# Patient Record
Sex: FEMALE | Race: WHITE | Employment: OTHER | URBAN - METROPOLITAN AREA
[De-identification: names, ages, dates, MRNs, and addresses within clinical notes are randomized per-mention and may not be internally consistent; named-entity substitution may affect disease eponyms.]

---

## 2017-03-03 ENCOUNTER — OFFICE VISIT (OUTPATIENT)
Dept: RHEUMATOLOGY | Facility: CLINIC | Age: 82
End: 2017-03-03
Payer: COMMERCIAL

## 2017-03-03 ENCOUNTER — RADIANT APPOINTMENT (OUTPATIENT)
Dept: GENERAL RADIOLOGY | Facility: CLINIC | Age: 82
End: 2017-03-03
Attending: INTERNAL MEDICINE
Payer: COMMERCIAL

## 2017-03-03 VITALS
BODY MASS INDEX: 19.83 KG/M2 | DIASTOLIC BLOOD PRESSURE: 64 MMHG | TEMPERATURE: 97 F | SYSTOLIC BLOOD PRESSURE: 92 MMHG | HEIGHT: 60 IN | OXYGEN SATURATION: 95 % | HEART RATE: 83 BPM | WEIGHT: 101 LBS

## 2017-03-03 DIAGNOSIS — M25.561 CHRONIC PAIN OF BOTH KNEES: ICD-10-CM

## 2017-03-03 DIAGNOSIS — M25.551 BILATERAL HIP PAIN: ICD-10-CM

## 2017-03-03 DIAGNOSIS — G89.29 CHRONIC PAIN OF BOTH KNEES: ICD-10-CM

## 2017-03-03 DIAGNOSIS — M25.562 CHRONIC PAIN OF BOTH KNEES: ICD-10-CM

## 2017-03-03 DIAGNOSIS — M25.552 BILATERAL HIP PAIN: ICD-10-CM

## 2017-03-03 DIAGNOSIS — M06.9 RHEUMATOID ARTHRITIS INVOLVING MULTIPLE SITES, UNSPECIFIED RHEUMATOID FACTOR PRESENCE: Primary | ICD-10-CM

## 2017-03-03 PROCEDURE — 73502 X-RAY EXAM HIP UNI 2-3 VIEWS: CPT | Mod: LT

## 2017-03-03 PROCEDURE — 99213 OFFICE O/P EST LOW 20 MIN: CPT | Performed by: INTERNAL MEDICINE

## 2017-03-03 PROCEDURE — 73502 X-RAY EXAM HIP UNI 2-3 VIEWS: CPT

## 2017-03-03 PROCEDURE — 73502 X-RAY EXAM HIP UNI 2-3 VIEWS: CPT | Mod: RT

## 2017-03-03 PROCEDURE — 73562 X-RAY EXAM OF KNEE 3: CPT | Mod: LT

## 2017-03-03 RX ORDER — HYDROXYCHLOROQUINE SULFATE 200 MG/1
200 TABLET, FILM COATED ORAL DAILY
Qty: 90 TABLET | Refills: 1 | Status: SHIPPED | OUTPATIENT
Start: 2017-03-03

## 2017-03-03 NOTE — NURSING NOTE
Chief Complaint   Patient presents with     Arthritis     RA, patient complains of left knee pain       Initial BP 92/64 (BP Location: Left arm, Patient Position: Chair, Cuff Size: Adult Regular)  Pulse 83  Temp 97  F (36.1  C) (Oral)  Ht 1.524 m (5')  Wt 45.8 kg (101 lb)  SpO2 95%  BMI 19.73 kg/m2 Estimated body mass index is 19.73 kg/(m^2) as calculated from the following:    Height as of this encounter: 1.524 m (5').    Weight as of this encounter: 45.8 kg (101 lb).  BP completed using cuff size: regular         RAPID3 (0-30) Cumulative Score  14.7          RAPID3 Weighted Score (divide #4 by 3 and that is the weighted score)  4.9

## 2017-03-03 NOTE — PROGRESS NOTES
"Rheumatology Clinic Visit      Marisela Ortez MRN# [id number]   YOB: 1924 Age: 89 year old      Date of visit: 3/03/17   PCP: Dr. FRANTZ Robertson            [phone] (Work)            [phone] (Fax)     Chief Complaint   Patient presents with:  Arthritis: RA, patient complains of left knee pain      Assessment and Plan   1. Rheumatoid arthritis: Reportedly seropositive by record review, but I do not see lab values for rheumatoid factor or CCP, and no comments regarding them. Previously on Orencia (\" I just didn't like that one and it cost too much\"). Previously on methotrexate (d/c d by pt request, no adverse effect).  Currently on  mg daily and doing well from an RA perspective.  I still do not have reports of the HCQ toxicity exam from ophthalmology; I gave her a business card to have her ophthalmologist send me the report when she is seen next, which is supposed to be in May or June, per patient.   - Continue hydroxychloroquine 200 mg daily  - Labs today: CBC, creatinine, hepatic panel     2. Bilateral patellofemoral syndrome: Now also with hip and ankle pain. He previously did not want to go to physical therapy but is now willing to go.   - X-ray bilateral hips and knees   - Physical therapy referral     3. Dry mouth: After the clinic visit, the patient's daughter (who was present during the clinic visit) called to report that the patient was having some dry mouth and was wondering if HCQ could cause this. I explained that she may be experiencing secondary Sjogren Syndrome and that she should take frequent such of water, avoid caffeine and sugary foods, and we discuss further at follow-up, sooner if needed.    Ms. Ortez verbalized agreement with and understanding of the rational for the diagnosis and treatment plan.  All questions were answered to best of my ability and the patient's satisfaction. Ms. Ortez was advised to contact the clinic with any questions that may arise " after the clinic visit.      Thank you for involving me in the care of the patient    Return to clinic: 4 months     HPI   Marisela Ortez is a 89 year old female with a medical history significant for osteoarthritis, osteoporosis, chronic low back pain, and rheumatoid arthritis who presents for follow-up of rheumatoid arthritis.    Today, she reports that she is doing well from her rheumatoid arthritis perspective, but has persistent knee, and now hip and ankle pain too.   Knee, hip, and ankle pain are all worse with more activity and by the end of the day. She says that she feels better after going to the pool and sitting in a hot tub. Recently with a fall that resulted in a skin abrasion that has healed better after going to wound care. Denies morning stiffness. She reports that her previous right fourth metacarpal fracture healed nonsurgically and is not an issue today.    Denies fevers, chills, nausea, vomiting, constipation, diarrhea. No abdominal pain. No chest pain/pressure, palpitations, or shortness of breath. No oral or nasal sores. No neck pain. No rash. No LE swelling.     Tobacco: None    EtOH: None    Drugs: None     The patient's daughter was present with her during the clinic visit today.    ROS   GEN: No fevers, chills, or night sweats  SKIN: No rashes. See history of present illness  HEENT: No oral or nasal ulcers.  CV: No chest pain, pressure, palpitations, or dyspnea on exertion.  PULM: No SOB, wheeze, cough.  GI:  No nausea, vomiting, constipation, diarrhea. No blood in stool. No abdominal pain.  : No blood in urine.  MSK: See HPI.  NEURO: No numbness, tingling, or weakness.  EXT: See history of present illness    Active Problem List     Patient Active Problem List   Diagnosis     OA (osteoarthritis)     High risk medications (not anticoagulants) long-term use     RA (rheumatoid arthritis) (H)     Nonproductive cough     Osteoporosis     Low back pain     Pain in the neck     Past Medical  History     Past Medical History   Diagnosis Date     Inflammatory arthritis      OA (osteoarthritis) 5/18/2010     Past Surgical History     Past Surgical History   Procedure Laterality Date     C stomach surgery procedure unlisted       Allergy     Allergies   Allergen Reactions     Penicillins      Current Medication List     Current Outpatient Prescriptions   Medication Sig     order for DME Equipment being ordered: [facility] Fax [phone]    Primary Dressing Majo   Qty 15  Secondary Dressing 4' dermacea roll gauze Qty 30  Secondary Dressing 2' medipore tape Qty 1  Length of Need: 1 month  Frequency of dressing change: daily     hydroxychloroquine (PLAQUENIL) 200 MG tablet Take 1 tablet (200 mg) by mouth daily     ALPRAZolam (XANAX) 0.25 MG tablet Take 0.25 mg by mouth     Calcium Carbonate (CALCIUM OYSTER SHELL) 1250 (500 CA) MG TABS Take 1 Tab by mouth Once Daily.     simvastatin (ZOCOR) 10 MG tablet TAKE 1 TABLET BY MOUTH ONCE DAILY.     acetaminophen (TYLENOL) 325 MG tablet Take 325 mg by mouth as needed.     DILTIAZEM CD CP24# 180 MG OR 1 CAPSULE DAILY     NORTRIPTYLINE HCL 10 MG OR CAPS 1 CAPSULE AT BEDTIME     ONE-A-DAY 50 PLUS OR None Entered     VITAMIN D 1000 UNIT OR TABS 1 TABLET DAILY     sulfamethoxazole-trimethoprim (BACTRIM DS,SEPTRA DS) 800-160 MG per tablet Reported on 3/3/2017     No current facility-administered medications for this visit.          Social History   See HPI    Family History   History reviewed. No pertinent family history.      Physical Exam     Temp Readings from Last 3 Encounters:   03/03/17 97  F (36.1  C) (Oral)   11/06/12 97.7  F (36.5  C) (Oral)   09/13/10 97.4  F (36.3  C) (Oral)     BP Readings from Last 5 Encounters:   03/03/17 92/64   09/12/16 114/71   09/01/16 134/83   06/02/16 110/68   07/18/13 140/80     Pulse Readings from Last 1 Encounters:   03/03/17 83     Resp Readings from Last 1 Encounters:   06/02/16 16     Estimated body mass index is 19.73  kg/(m^2) as calculated from the following:    Height as of this encounter: 1.524 m (5').    Weight as of this encounter: 45.8 kg (101 lb).    GEN: NAD  HEENT: MMM. No oral lesions. Anicteric, noninjected sclera  CV: S1, S2. RRR. No lower L m/r/g.  PULM: CTA bilaterally. No w/c.  MSK: Synovial swelling of the bilateral second-third MCPs but without tenderness to palpation. Mild subluxation of the right second and third MCPs. Wrists, elbows, and shoulders without swelling or tenderness to palpation. Hips nontender to direct palpation or with range of motion. Knees with crepitation bilaterally but no swelling or increased warmth. Ankles without swelling or tenderness to palpation. Negative MTP squeeze. Pes planus bilaterally.   SKIN: Old postsurgical changes of her nose where it appears that a skin lesion was removed   EXT: No LE edema  PSYCH: Alert. Appropriate.    Labs / Imaging (select studies)     CBC  Recent Labs   Lab Test  09/01/16   0950  06/02/16   1447  07/15/13   1522   WBC  7.4  7.2  6.6   RBC  4.47  4.74  4.40   HGB  13.2  13.9  13.8   HCT  41.7  43.4  42.5   MCV  93  92  97   RDW  14.7  16.1*  14.1   PLT  215  266  237   MCH  29.5  29.3  31.4   MCHC  31.7  32.0  32.5   NEUTROPHIL  74.8  74.6  75.8   LYMPH  15.3  15.2  15.8   MONOCYTE  8.0  7.7  6.1   EOSINOPHIL  1.2  1.7  1.8   BASOPHIL  0.7  0.8  0.5   ANEU  5.5  5.4  5.1   ALYM  1.1  1.1  1.0   MARYANN  0.6  0.6  0.4   AEOS  0.1  0.1  0.1   ABAS  0.1  0.1  0.0     CMP  Recent Labs   Lab Test  09/01/16   0950 07/15/16  06/02/16   1447  07/15/13   1522  05/17/12  05/10/10   NA   --    --    --    --    --   139   --    --    POTASSIUM   --   3.8   --    --    --   3.8   --    --    CHLORIDE   --    --    --    --    --   103   --    --    ANIONGAP   --    --    --    --    --   8.0   --    --    GLC   --   83   --    --    --   86   --    --    BUN   --    --    --    --    --   16   --    --    CR  0.84  0.79  1.10*  0.79   < >  0.78   < >  0.69@    GFRESTIMATED  63  >60  46*  69   < >   --    < >  >60@   GFRESTBLACK  76  >60  56*  83   < >   --    < >  >60@   LINDA   --    --    --    --    --   9.5   --    --    BILITOTAL  0.5   --   0.3   --    --    --    --   0.5@   ALBUMIN  3.9   --   4.0  3.7   < >   --    < >  3.9@   PROTTOTAL  6.9   --   7.1   --    --    --    --   6.6@   ALKPHOS  97   --   102   --    --    --    --   89@   AST  24  25  31  34   < >   --    < >  22@   ALT  22  22  28  24   < >   --    < >  15@    < > = values in this interval not displayed.     Calcium/VitaminD  Recent Labs   Lab Test 05/17/12   LINDA  9.5     ESR/CRP  Recent Labs   Lab Test  06/02/16   1447   SED  7   CRP  <2.9     Lipid Panel  Recent Labs   Lab Test 07/15/16 05/31/13 05/17/12   CHOL  164  131  138   TRIG  83  90  70   HDL  71  56  65   LDL  76  57  59   CHOLHDLRATIO   --   2.3  2.1      Immunization History     There is no immunization history on file for this patient.       Chart documentation done in part with Dragon Voice recognition Software. Although reviewed after completion, some word and grammatical error may remain.    Len Vann MD    Many notes

## 2017-03-03 NOTE — MR AVS SNAPSHOT
After Visit Summary   3/3/2017    Marisela Ortez    MRN: [id number]           Patient Information     Date Of Birth          8/17/1924        Visit Information        Provider Department      3/3/2017 9:40 AM Len Vann MD [facility]        Today's Diagnoses     Rheumatoid arthritis involving multiple sites, unspecified rheumatoid factor presence (H)    -  1    Chronic pain of both knees        Bilateral hip pain           Follow-ups after your visit        Additional Services     CESAR PT, HAND, AND CHIROPRACTIC REFERRAL       **This order will print in the [facility] Scheduling Office**    Physical Therapy, Hand Therapy and Chiropractic Care are available through:    *[facility]  *[facility]  *[facility]    Call one number to schedule at any of the above locations: [phone].    Your provider has referred you to: Physical Therapy at [facility] or [facility]    Indication/Reason for Referral: bilateral knee pain, bilateral hip pain  Onset of Illness: years  Therapy Orders: Evaluate and Treat  Special Programs: None  Special Request: None    Luiza Jarrett      Additional Comments for the Therapist or Chiropractor: OA and RA    Please be aware that coverage of these services is subject to the terms and limitations of your health insurance plan.  Call member services at your health plan with any benefit or coverage questions.      Please bring the following to your appointment:    *Your personal calendar for scheduling future appointments  *Comfortable clothing                  Your next 10 appointments already scheduled     Jul 07, 2017  9:40 AM CDT   Return Visit with MD [facility] ([facility] [place])    [address]  [place] [address]   [phone]              Future tests that were ordered for you today     Open Future Orders        Priority Expected Expires Ordered    XR Knee Bilateral 3  "Views Routine 3/3/2017 3/3/2018 3/3/2017    XR Hip Right 2-3 Views Routine 3/3/2017 3/3/2018 3/3/2017    XR Hip Left 2-3 Views Routine 3/3/2017 3/3/2018 3/3/2017            Who to contact     If you have questions or need follow up information about today's clinic visit or your schedule please contact [facility] directly at [phone].  Normal or non-critical lab and imaging results will be communicated to you by MyChart, letter or phone within 4 business days after the clinic has received the results. If you do not hear from us within 7 days, please contact the clinic through [employer] or phone. If you have a critical or abnormal lab result, we will notify you by phone as soon as possible.  Submit refill requests through [employer] or call your pharmacy and they will forward the refill request to us. Please allow 3 business days for your refill to be completed.          Additional Information About Your Visit        [employer] Information     [employer] lets you send messages to your doctor, view your test results, renew your prescriptions, schedule appointments and more. To sign up, go to [email or web address] . Click on \"Log in\" on the left side of the screen, which will take you to the Welcome page. Then click on \"Sign up Now\" on the right side of the page.     You will be asked to enter the access code listed below, as well as some personal information. Please follow the directions to create your username and password.     Your access code is: [email or web address]  Expires: 2017 10:15 AM     Your access code will  in 90 days. If you need help or a new code, please call your [place] clinic or [phone].        Care EveryWhere ID     This is your Care EveryWhere ID. This could be used by other organizations to access your [place] medical records  [id number]        Your Vitals Were     Pulse Temperature Height Pulse Oximetry BMI (Body Mass Index)       83 97  F (36.1  C) (Oral) 1.524 m (5') 95% " 19.73 kg/m2        Blood Pressure from Last 3 Encounters:   03/03/17 92/64   09/12/16 114/71   09/01/16 134/83    Weight from Last 3 Encounters:   03/03/17 45.8 kg (101 lb)   06/02/16 46.7 kg (103 lb)   07/18/13 50 kg (110 lb 3.2 oz)              We Performed the Following     CESAR PT, HAND, AND CHIROPRACTIC REFERRAL          Where to get your medicines      These medications were sent to [id number] - [place], [place] - [address]  [address] [address]     Phone:  [phone]     hydroxychloroquine 200 MG tablet          Primary Care Provider Office Phone #    [facility] [phone]       No address on file        Thank you!     Thank you for choosing [facility]  for your care. Our goal is always to provide you with excellent care. Hearing back from our patients is one way we can continue to improve our services. Please take a few minutes to complete the written survey that you may receive in the mail after your visit with us. Thank you!             Your Updated Medication List - Protect others around you: Learn how to safely use, store and throw away your medicines at www.disposemymeds.org.          This list is accurate as of: 3/3/17 10:15 AM.  Always use your most recent med list.                   Brand Name Dispense Instructions for use    ALPRAZolam 0.25 MG tablet    XANAX     Take 0.25 mg by mouth       Calcium Oyster Shell 1250 (500 CA) MG Tabs      Take 1 Tab by mouth Once Daily.       cholecalciferol 1000 UNIT tablet    vitamin D     1 TABLET DAILY       DILTIAZEM CD CP24# 180 MG OR      1 CAPSULE DAILY       hydroxychloroquine 200 MG tablet    PLAQUENIL    90 tablet    Take 1 tablet (200 mg) by mouth daily       nortriptyline 10 MG capsule    PAMELOR     1 CAPSULE AT BEDTIME       ONE-A-DAY 50 PLUS PO      None Entered       order for DME     30 days    Equipment being ordered: [facility] Fax [phone]  Primary Dressing  Majo   Qty 15 Secondary Dressing 4' dermacea roll gauze Qty 30 Secondary Dressing 2' medipore tape Qty 1 Length of Need: 1 month Frequency of dressing change: daily       simvastatin 10 MG tablet    ZOCOR     TAKE 1 TABLET BY MOUTH ONCE DAILY.       sulfamethoxazole-trimethoprim 800-160 MG per tablet    BACTRIM DS/SEPTRA DS     Reported on 3/3/2017       TYLENOL 325 MG tablet   Generic drug:  acetaminophen      Take 325 mg by mouth as needed.

## 2017-03-03 NOTE — Clinical Note
Please fax my clinic note dated 3/3/2017 to Ms. Pérez's PCP (double check the contact info listed below):  Dr. FRANTZ Robertson           [phone] (Work)           [phone] (Fax)   Thank you, Len Vann MD 3/3/2017 12:29 PM

## 2017-03-13 ENCOUNTER — THERAPY VISIT (OUTPATIENT)
Dept: PHYSICAL THERAPY | Facility: CLINIC | Age: 82
End: 2017-03-13
Payer: COMMERCIAL

## 2017-03-13 DIAGNOSIS — M25.551 BILATERAL HIP PAIN: ICD-10-CM

## 2017-03-13 DIAGNOSIS — M25.562 CHRONIC PAIN OF BOTH KNEES: Primary | ICD-10-CM

## 2017-03-13 DIAGNOSIS — G89.29 CHRONIC PAIN OF BOTH KNEES: Primary | ICD-10-CM

## 2017-03-13 DIAGNOSIS — M25.561 CHRONIC PAIN OF BOTH KNEES: Primary | ICD-10-CM

## 2017-03-13 DIAGNOSIS — M25.552 BILATERAL HIP PAIN: ICD-10-CM

## 2017-03-13 PROCEDURE — G8978 MOBILITY CURRENT STATUS: HCPCS | Mod: GP | Performed by: PHYSICAL THERAPIST

## 2017-03-13 PROCEDURE — 97161 PT EVAL LOW COMPLEX 20 MIN: CPT | Mod: GP | Performed by: PHYSICAL THERAPIST

## 2017-03-13 PROCEDURE — G8979 MOBILITY GOAL STATUS: HCPCS | Mod: GP | Performed by: PHYSICAL THERAPIST

## 2017-03-13 PROCEDURE — 97110 THERAPEUTIC EXERCISES: CPT | Mod: GP | Performed by: PHYSICAL THERAPIST

## 2017-03-13 PROCEDURE — 97530 THERAPEUTIC ACTIVITIES: CPT | Mod: GP | Performed by: PHYSICAL THERAPIST

## 2017-03-13 ASSESSMENT — ACTIVITIES OF DAILY LIVING (ADL)
LIMPING: I DO NOT HAVE THE SYMPTOM
PAIN: I HAVE THE SYMPTOM BUT IT DOES NOT AFFECT MY ACTIVITY
STAND: ACTIVITY IS NOT DIFFICULT
STIFFNESS: I HAVE THE SYMPTOM BUT IT DOES NOT AFFECT MY ACTIVITY
GIVING WAY, BUCKLING OR SHIFTING OF KNEE: I DO NOT HAVE THE SYMPTOM
AS_A_RESULT_OF_YOUR_KNEE_INJURY,_HOW_WOULD_YOU_RATE_YOUR_CURRENT_LEVEL_OF_DAILY_ACTIVITY?: NEARLY NORMAL
KNEEL ON THE FRONT OF YOUR KNEE: NOT ANSWERED
HOW_WOULD_YOU_RATE_THE_CURRENT_FUNCTION_OF_YOUR_KNEE_DURING_YOUR_USUAL_DAILY_ACTIVITIES_ON_A_SCALE_FROM_0_TO_100_WITH_100_BEING_YOUR_LEVEL_OF_KNEE_FUNCTION_PRIOR_TO_YOUR_INJURY_AND_0_BEING_THE_INABILITY_TO_PERFORM_ANY_OF_YOUR_USUAL_DAILY_ACTIVITIES?: 50
SWELLING: I HAVE THE SYMPTOM BUT IT DOES NOT AFFECT MY ACTIVITY
WALK: NOT ANSWERED
GO UP STAIRS: ACTIVITY IS SOMEWHAT DIFFICULT
SIT WITH YOUR KNEE BENT: ACTIVITY IS NOT DIFFICULT
RISE FROM A CHAIR: ACTIVITY IS SOMEWHAT DIFFICULT
GO DOWN STAIRS: ACTIVITY IS SOMEWHAT DIFFICULT
WEAKNESS: THE SYMPTOM AFFECTS MY ACTIVITY SLIGHTLY
KNEE_ACTIVITY_OF_DAILY_LIVING_SUM: 47
SQUAT: ACTIVITY IS SOMEWHAT DIFFICULT
HOW_WOULD_YOU_RATE_THE_OVERALL_FUNCTION_OF_YOUR_KNEE_DURING_YOUR_USUAL_DAILY_ACTIVITIES?: NEARLY NORMAL

## 2017-03-13 NOTE — PROGRESS NOTES
Physical Therapy Initial Examination/Evaluation  Precautions/Restrictions/MD instructions  B hip and knee pain, ET    Therapist Impression:   Marisela is a 89 yr old woman with complains of chronic B hip and knee pain.  X rays show loss of patellofemoral joint space B and unremarkable hip xrays normal for her age.  She has stairs to the basement at home she sometimes uses for exercise and participates in a 1 hr arthritis water aerobics class 3x/wk.  Hip ROM WFL, knee ROM full extension and flexion WFL.  Mild loss of flexibility in B hip flexors.  Global hip and knee strength 4-/5 with glute med 3+/5 B.     Subjective:  DOI/onset: Chronic 15+ yr hx, changed arthritis doctors and is trying PT on new MD's suggestion.  MD order date  DOS:   Acute Injury or Gradual Onset?: gradual  Mechanism of Injury:   Related PMH: degenerative joint disease, RA  Previous Treatment: none  Effect of prior treatment:   Imaging: x rays show OA B patellofemoral joints, normal/age appropriate hip xray  Chief Complaint/Functional Limitations: stairs, rising from sitting   Pain:6/10 Location:anterior knees B Frequency: intermittant but daily Described as:  ache Alleviated by: ?  Progression of Symptoms:   Staying same  Time of day when pain is worse:  AM, PM  Sleeping:  No waking  Occupation:   Job duties:   Current HEP/exercise regimen: arthritis exercise in water 3x/wk for 1 hr. Stairs at home several times a week to basement.  Patient's goals are rise from sitting, stairs with less pain    Other pertinent PMH/Red Flags: RA  Barriers at home/work:  Lives alone  Pertinent Surgical History: none  Medications: RA meds  General health as reported by patient: good  Return to MD: prn      HPI                    Objective:    Standing Alignment:        Lumbar:  Lordosis decr                Flexibility/Screens:       Lower Extremity:  Decreased left lower extremity flexibility:Hip Flexors    Decreased right lower extremity flexibility:  Hip  Flexors                                                 Hip Evaluation  HIP AROM:  AROM:    Left Hip:     Normal    Right Hip:   Normal                  Hip PROM:  Hip PROM:  Left Hip:    Normal  Right Hip:  Normal                          Hip Strength:  : 4-/5 except glute med 3+/5. : 4-/5 except glute med 3+/5.                          Hip Special Testing:      Left hip negative for the following special tests:  Piriformis; Kyra; Fadir/Labrum or SLR  Right hip negative for the following special tests:  Piriformis; Kyra; Fadir/Labrum or SLR    Hip Palpation:    Left hip tenderness present at:   Greater Trachanter  Right hip tenderness present at:  [facility]er Trachanter       Knee Evaluation:  ROM:  AROM: normal  PROM: normal  Strength wnl knee: globally 4-/5.            Ligament Testing:  Not Assessed                Special Tests: Not Assessed      Palpation:  Not Assessed      Edema:  Normal    Mobility Testing:  Normal                  General     ROS   Functional strength and control standing evaluation    Step down: NT    Sit to stand:  Valgus collapse/knees pressed together.  Improved with verbal and visual cues.      Single leg stance/balance: NT  Gait deviations: Decr hip extension B with gait.          Assessment/Plan:      Patient is a 89 year old female with both sides knee complaints.    Patient has the following significant findings with corresponding treatment plan.                Diagnosis 1:  Knee pain, osteoarthritis B knees.  Pain -  hot/cold therapy, manual therapy, self management, education, directional preference exercise and home program  Decreased ROM/flexibility - manual therapy and therapeutic exercise  Decreased joint mobility - manual therapy and therapeutic exercise  Decreased strength - therapeutic exercise and therapeutic activities  Decreased proprioception - neuro re-education and therapeutic activities  Impaired muscle performance - neuro re-education  Decreased function -  therapeutic activities    Therapy Evaluation Codes:   1) History comprised of:   Personal factors that impact the plan of care:      None.    Comorbidity factors that impact the plan of care are:      Osteoarthritis and Rheumatoid arthritis.     Medications impacting care: None.  2) Examination of Body Systems comprised of:   Body structures and functions that impact the plan of care:      Hip and Knee.   Activity limitations that impact the plan of care are:      Squatting/kneeling, Stairs and Walking.  3) Clinical presentation characteristics are:   Stable/Uncomplicated.  4) Decision-Making    Low complexity using standardized patient assessment instrument and/or measureable assessment of functional outcome.  Cumulative Therapy Evaluation is: Low complexity.    Previous and current functional limitations:  (See Goal Flow Sheet for this information)    Short term and Long term goals: (See Goal Flow Sheet for this information)     Communication ability:  Patient appears to be able to clearly communicate and understand verbal and written communication and follow directions correctly.  Treatment Explanation - The following has been discussed with the patient:   RX ordered/plan of care  Anticipated outcomes  Possible risks and side effects  This patient would benefit from PT intervention to resume normal activities.   Rehab potential is good.    Frequency:  1 X week, once daily  Duration:  for 2 weeks  Discharge Plan:  Achieve all LTG.  Independent in home treatment program.  Reach maximal therapeutic benefit.    Please refer to the daily flowsheet for treatment today, total treatment time and time spent performing 1:1 timed codes.

## 2017-03-20 ENCOUNTER — THERAPY VISIT (OUTPATIENT)
Dept: PHYSICAL THERAPY | Facility: CLINIC | Age: 82
End: 2017-03-20
Payer: COMMERCIAL

## 2017-03-20 DIAGNOSIS — M25.561 CHRONIC PAIN OF BOTH KNEES: ICD-10-CM

## 2017-03-20 DIAGNOSIS — M25.562 CHRONIC PAIN OF BOTH KNEES: ICD-10-CM

## 2017-03-20 DIAGNOSIS — M25.552 BILATERAL HIP PAIN: ICD-10-CM

## 2017-03-20 DIAGNOSIS — M25.551 BILATERAL HIP PAIN: ICD-10-CM

## 2017-03-20 DIAGNOSIS — G89.29 CHRONIC PAIN OF BOTH KNEES: ICD-10-CM

## 2017-03-20 PROCEDURE — 97530 THERAPEUTIC ACTIVITIES: CPT | Mod: GP | Performed by: PHYSICAL THERAPIST

## 2017-03-20 PROCEDURE — 97110 THERAPEUTIC EXERCISES: CPT | Mod: GP | Performed by: PHYSICAL THERAPIST

## 2017-04-27 ENCOUNTER — OFFICE VISIT (OUTPATIENT)
Dept: RHEUMATOLOGY | Facility: CLINIC | Age: 82
End: 2017-04-27
Payer: COMMERCIAL

## 2017-04-27 VITALS
SYSTOLIC BLOOD PRESSURE: 124 MMHG | WEIGHT: 101.4 LBS | DIASTOLIC BLOOD PRESSURE: 79 MMHG | RESPIRATION RATE: 16 BRPM | OXYGEN SATURATION: 93 % | HEART RATE: 91 BPM | BODY MASS INDEX: 19.91 KG/M2 | HEIGHT: 60 IN

## 2017-04-27 DIAGNOSIS — M25.552 HIP PAIN, LEFT: ICD-10-CM

## 2017-04-27 DIAGNOSIS — M70.62 TROCHANTERIC BURSITIS OF LEFT HIP: ICD-10-CM

## 2017-04-27 DIAGNOSIS — M06.9 RHEUMATOID ARTHRITIS INVOLVING MULTIPLE SITES, UNSPECIFIED RHEUMATOID FACTOR PRESENCE: Primary | ICD-10-CM

## 2017-04-27 LAB
ALBUMIN SERPL-MCNC: 3.9 G/DL (ref 3.4–5)
ALP SERPL-CCNC: 100 U/L (ref 40–150)
ALT SERPL W P-5'-P-CCNC: 25 U/L (ref 0–50)
AST SERPL W P-5'-P-CCNC: 26 U/L (ref 0–45)
BASOPHILS # BLD AUTO: 0.1 10E9/L (ref 0–0.2)
BASOPHILS NFR BLD AUTO: 0.9 %
BILIRUB DIRECT SERPL-MCNC: <0.1 MG/DL (ref 0–0.2)
BILIRUB SERPL-MCNC: 0.4 MG/DL (ref 0.2–1.3)
CREAT SERPL-MCNC: 0.9 MG/DL (ref 0.52–1.04)
CRP SERPL-MCNC: <2.9 MG/L (ref 0–8)
DIFFERENTIAL METHOD BLD: ABNORMAL
EOSINOPHIL # BLD AUTO: 0.1 10E9/L (ref 0–0.7)
EOSINOPHIL NFR BLD AUTO: 1.2 %
ERYTHROCYTE [DISTWIDTH] IN BLOOD BY AUTOMATED COUNT: 16.5 % (ref 10–15)
ERYTHROCYTE [SEDIMENTATION RATE] IN BLOOD BY WESTERGREN METHOD: 9 MM/H (ref 0–30)
GFR SERPL CREATININE-BSD FRML MDRD: 59 ML/MIN/1.7M2
HCT VFR BLD AUTO: 45.2 % (ref 35–47)
HGB BLD-MCNC: 14.2 G/DL (ref 11.7–15.7)
LYMPHOCYTES # BLD AUTO: 1.3 10E9/L (ref 0.8–5.3)
LYMPHOCYTES NFR BLD AUTO: 14.8 %
MCH RBC QN AUTO: 27.6 PG (ref 26.5–33)
MCHC RBC AUTO-ENTMCNC: 31.4 G/DL (ref 31.5–36.5)
MCV RBC AUTO: 88 FL (ref 78–100)
MONOCYTES # BLD AUTO: 0.6 10E9/L (ref 0–1.3)
MONOCYTES NFR BLD AUTO: 6.8 %
NEUTROPHILS # BLD AUTO: 6.8 10E9/L (ref 1.6–8.3)
NEUTROPHILS NFR BLD AUTO: 76.3 %
PLATELET # BLD AUTO: 354 10E9/L (ref 150–450)
PROT SERPL-MCNC: 7.4 G/DL (ref 6.8–8.8)
RBC # BLD AUTO: 5.15 10E12/L (ref 3.8–5.2)
WBC # BLD AUTO: 8.9 10E9/L (ref 4–11)

## 2017-04-27 PROCEDURE — 99213 OFFICE O/P EST LOW 20 MIN: CPT | Mod: 25 | Performed by: INTERNAL MEDICINE

## 2017-04-27 PROCEDURE — 20610 DRAIN/INJ JOINT/BURSA W/O US: CPT | Mod: LT | Performed by: INTERNAL MEDICINE

## 2017-04-27 PROCEDURE — 85025 COMPLETE CBC W/AUTO DIFF WBC: CPT | Performed by: INTERNAL MEDICINE

## 2017-04-27 PROCEDURE — 86140 C-REACTIVE PROTEIN: CPT | Performed by: INTERNAL MEDICINE

## 2017-04-27 PROCEDURE — 36415 COLL VENOUS BLD VENIPUNCTURE: CPT | Performed by: INTERNAL MEDICINE

## 2017-04-27 PROCEDURE — 85652 RBC SED RATE AUTOMATED: CPT | Performed by: INTERNAL MEDICINE

## 2017-04-27 PROCEDURE — 82565 ASSAY OF CREATININE: CPT | Performed by: INTERNAL MEDICINE

## 2017-04-27 PROCEDURE — 80076 HEPATIC FUNCTION PANEL: CPT | Performed by: INTERNAL MEDICINE

## 2017-04-27 RX ORDER — METHYLPREDNISOLONE ACETATE 40 MG/ML
40 INJECTION, SUSPENSION INTRA-ARTICULAR; INTRALESIONAL; INTRAMUSCULAR; SOFT TISSUE ONCE
Qty: 1 ML | Refills: 0 | OUTPATIENT
Start: 2017-04-27 | End: 2017-04-27

## 2017-04-27 ASSESSMENT — PAIN SCALES - GENERAL: PAINLEVEL: EXTREME PAIN (8)

## 2017-04-27 NOTE — NURSING NOTE
Chief Complaint   Patient presents with     Arthritis     RA. Left hip pain. Onset: months but the last couple of weeks it has been worse. Pain is in the outer hip. [facility] went to [facility]        Initial /79  Pulse 91  Resp 16  Ht 1.524 m (5')  Wt 46 kg (101 lb 6.4 oz)  SpO2 93%  BMI 19.8 kg/m2 Estimated body mass index is 19.8 kg/(m^2) as calculated from the following:    Height as of this encounter: 1.524 m (5').    Weight as of this encounter: 46 kg (101 lb 6.4 oz).  Medication Reconciliation: peace Tillman Certified Medical Assistant  RAPID3 (0-30) Cumulative Score  20.8          RAPID3 Weighted Score (divide #4 by 3 and that is the weighted score)  6.933

## 2017-04-27 NOTE — NURSING NOTE
The following medication was given:     MEDICATION: Depo Medrol 40mg  SITE: Left hip  DOSE: 1 ml  LOT #: [id number]  :  [employer]  EXPIRATION DATE:  10/2017  NDC#: 9736-5970-03

## 2017-04-27 NOTE — MR AVS SNAPSHOT
After Visit Summary   4/27/2017    Marisela Ortez    MRN: [id number]           Patient Information     Date Of Birth          8/17/1924        Visit Information        Provider Department      4/27/2017 1:20 PM Len Vann MD [facility]        Today's Diagnoses     Rheumatoid arthritis involving multiple sites, unspecified rheumatoid factor presence (H)    -  1    Hip pain, left        Trochanteric bursitis of left hip           Follow-ups after your visit        Additional Services     CESAR PT, HAND, AND CHIROPRACTIC REFERRAL       **This order will print in the [facility] Scheduling Office**    Physical Therapy is available through:    *[facility]  *[facility]  *[facility]    Call one number to schedule at any of the above locations: [phone].    Your provider has referred you to: Physical Therapy at [facility] or [facility]    Indication/Reason for Referral: left hip pain  Onset of Illness: years  Therapy Orders: Evaluate and Treat  Special Programs: None  Special Request: None    Luiza Jarrett      Additional Comments for the Therapist or Chiropractor: none    Please be aware that coverage of these services is subject to the terms and limitations of your health insurance plan.  Call member services at your health plan with any benefit or coverage questions.      Please bring the following to your appointment:    *Your personal calendar for scheduling future appointments  *Comfortable clothing                  Your next 10 appointments already scheduled     Aug 10, 2017 10:00 AM CDT   Return Visit with MD Aubrey Boss (Aubrey Stephens)    [address]  [place] [address]   [phone]              Who to contact     If you have questions or need follow up information about today's clinic visit or your schedule please contact AUBREY STEPHENS directly at [phone].  Normal or  "non-critical lab and imaging results will be communicated to you by MyChart, letter or phone within 4 business days after the clinic has received the results. If you do not hear from us within 7 days, please contact the clinic through [employer] or phone. If you have a critical or abnormal lab result, we will notify you by phone as soon as possible.  Submit refill requests through [employer] or call your pharmacy and they will forward the refill request to us. Please allow 3 business days for your refill to be completed.          Additional Information About Your Visit        [employer] Information     [employer] lets you send messages to your doctor, view your test results, renew your prescriptions, schedule appointments and more. To sign up, go to [email or web address] . Click on \"Log in\" on the left side of the screen, which will take you to the Welcome page. Then click on \"Sign up Now\" on the right side of the page.     You will be asked to enter the access code listed below, as well as some personal information. Please follow the directions to create your username and password.     Your access code is: [email or web address]  Expires: 2017 11:15 AM     Your access code will  in 90 days. If you need help or a new code, please call your [place] clinic or [phone].        Care EveryWhere ID     This is your Care EveryWhere ID. This could be used by other organizations to access your [place] medical records  [id number]        Your Vitals Were     Pulse Respirations Height Pulse Oximetry BMI (Body Mass Index)       91 16 1.524 m (5') 93% 19.8 kg/m2        Blood Pressure from Last 3 Encounters:   17 124/79   17 92/64   16 114/71    Weight from Last 3 Encounters:   17 46 kg (101 lb 6.4 oz)   17 45.8 kg (101 lb)   16 46.7 kg (103 lb)              We Performed the Following     CBC with platelets differential     Creatinine     CRP inflammation     Erythrocyte sedimentation rate auto  "    Hepatic panel     CESAR PT, HAND, AND CHIROPRACTIC REFERRAL        Primary Care Provider Office Phone # Fax #    M Lili Robertson [phone]       [address] [address]        Thank you!     Thank you for choosing [facility]  for your care. Our goal is always to provide you with excellent care. Hearing back from our patients is one way we can continue to improve our services. Please take a few minutes to complete the written survey that you may receive in the mail after your visit with us. Thank you!             Your Updated Medication List - Protect others around you: Learn how to safely use, store and throw away your medicines at www.disposemymeds.org.          This list is accurate as of: 4/27/17  2:03 PM.  Always use your most recent med list.                   Brand Name Dispense Instructions for use    ALPRAZolam 0.25 MG tablet    XANAX     Take 0.25 mg by mouth       Calcium Oyster Shell 1250 (500 CA) MG Tabs      Take 1 Tab by mouth Once Daily.       cholecalciferol 1000 UNIT tablet    vitamin D     1 TABLET DAILY       DILTIAZEM CD CP24# 180 MG OR      1 CAPSULE DAILY       hydroxychloroquine 200 MG tablet    PLAQUENIL    90 tablet    Take 1 tablet (200 mg) by mouth daily       nortriptyline 10 MG capsule    PAMELOR     1 CAPSULE AT BEDTIME       ONE-A-DAY 50 PLUS PO      None Entered       order for DME     30 days    Equipment being ordered: [facility] Fax [phone]  Primary Dressing Majo   Qty 15 Secondary Dressing 4' dermacea roll gauze Qty 30 Secondary Dressing 2' medipore tape Qty 1 Length of Need: 1 month Frequency of dressing change: daily       simvastatin 10 MG tablet    ZOCOR     TAKE 1 TABLET BY MOUTH ONCE DAILY.       sulfamethoxazole-trimethoprim 800-160 MG per tablet    BACTRIM DS/SEPTRA DS     Reported on 3/3/2017       TYLENOL 325 MG tablet   Generic drug:  acetaminophen      Take 325 mg by mouth as  needed.

## 2017-04-27 NOTE — PROGRESS NOTES
"Rheumatology Clinic Visit      Marisela Ortez MRN# [id number]   YOB: 1924 Age: 89 year old      Date of visit: 4/27/17   PCP: Dr. FRANTZ Robertson            [phone] (Work)            [phone] (Fax)     Chief Complaint   Patient presents with:  Arthritis: RA. Left hip pain. Onset: months but the last couple of weeks it has been worse. Pain is in the outer hip. She went to therapy 2 times but goes to the  and does pretty much the same stuff there.       Assessment and Plan   1. Rheumatoid arthritis: Reportedly seropositive by record review, but I do not see lab values for rheumatoid factor or CCP, and no comments regarding them. Previously on Orencia (\" I just didn't like that one and it cost too much\"). Previously on methotrexate (d/c d by pt request, no adverse effect).  Currently on  mg daily and doing well from an RA perspective.  I still do not have reports of the HCQ toxicity exam from ophthalmology; I previously gave her a business card to have her ophthalmologist send me the report when she is seen next, which is supposed to be in May or June, per patient.   - Continue hydroxychloroquine 200 mg daily  - Labs today: CBC, creatinine, hepatic panel, ESR, CRP    2. Bilateral patellofemoral syndrome: She went to physical therapy that made her knees feel a bit worse but she is not doing the exercises on a regular basis. She believes that she is getting enough exercise by doing her pool therapy that she has always done.   As some of her pain will worsen with physical therapy before it improves. I also discussed the option of doing intra-articular steroid injections but she refused. I encouraged her to continue doing her exercises.     3. Left trochanteric bursitis: Previous x-rays did not reveal osteoarthritis. Symptoms right now are more consistent with left trochanteric bursitis. We discussed the diagnoses and treatment options. Steroid injection of the left trochanteric bursa " today as documented in procedure section. Physical therapy referral.    4. Dry mouth but no dry eyes: This could be a secondary phenomenon such as secondary Sjogren syndrome, due to rheumatoid arthritis. However, she has no dry eyes. I suspect that her symptoms are secondary to medications. We discussed the symptomatic treatment options. Continue frequent sips of water.    Ms. Ortez verbalized agreement with and understanding of the rational for the diagnosis and treatment plan.  All questions were answered to best of my ability and the patient's satisfaction. Ms. Ortez was advised to contact the clinic with any questions that may arise after the clinic visit.      Thank you for involving me in the care of the patient    Return to clinic: 4 months     HPI   Marisela Ortez is a 89 year old female with a medical history significant for osteoarthritis, osteoporosis, chronic low back pain, and rheumatoid arthritis who presents for follow-up of rheumatoid arthritis.    Today, she reports that her rheumatoid arthritis is doing well. Her bilateral knee pain has improved only slightly. She went to physical therapy twice and worsened so she stopped going. She does the exercises in her pool, as she has been doing for a long time now, and believes that this is sufficient. However, she continues to have knee pain. She does not want to go back to physical therapy. She does not want to do the physical therapy exercises on her own at home. She continues to have left hip pain, worse if she lies on the affected side or if she ambulates for a longer duration. No hand pain. No morning stiffness. She plans to see ophthalmology in May or June.    Denies fevers, chills, nausea, vomiting, constipation, diarrhea. No abdominal pain. No chest pain/pressure, palpitations, or shortness of breath. No oral or nasal sores. No neck pain. No rash. No LE swelling.     Tobacco: None    EtOH: None    Drugs: None     The patient's daughter was  present with her during the clinic visit today.    ROS   GEN: No fevers, chills, or night sweats  SKIN: No rashes. See history of present illness  HEENT: No oral or nasal ulcers.  CV: No chest pain, pressure, palpitations, or dyspnea on exertion.  PULM: No SOB, wheeze, cough.  GI:  No nausea, vomiting, constipation, diarrhea. No blood in stool. No abdominal pain.  : No blood in urine.  MSK: See HPI.  NEURO: No numbness, tingling, or weakness.  EXT: See history of present illness    Active Problem List     Patient Active Problem List   Diagnosis     OA (osteoarthritis)     High risk medications (not anticoagulants) long-term use     RA (rheumatoid arthritis) (H)     Nonproductive cough     Osteoporosis     Low back pain     Pain in the neck     Chronic pain of both knees     Bilateral hip pain     Past Medical History     Past Medical History:   Diagnosis Date     Inflammatory arthritis      OA (osteoarthritis) 5/18/2010     Past Surgical History     Past Surgical History:   Procedure Laterality Date     C STOMACH SURGERY PROCEDURE UNLISTED       Allergy     Allergies   Allergen Reactions     Penicillins      Current Medication List     Current Outpatient Prescriptions   Medication Sig     hydroxychloroquine (PLAQUENIL) 200 MG tablet Take 1 tablet (200 mg) by mouth daily     order for DME Equipment being ordered: [facility] Fax [phone]    Primary Dressing Majo   Qty 15  Secondary Dressing 4' dermacea roll gauze Qty 30  Secondary Dressing 2' medipore tape Qty 1  Length of Need: 1 month  Frequency of dressing change: daily     ALPRAZolam (XANAX) 0.25 MG tablet Take 0.25 mg by mouth     Calcium Carbonate (CALCIUM OYSTER SHELL) 1250 (500 CA) MG TABS Take 1 Tab by mouth Once Daily.     simvastatin (ZOCOR) 10 MG tablet TAKE 1 TABLET BY MOUTH ONCE DAILY.     sulfamethoxazole-trimethoprim (BACTRIM DS,SEPTRA DS) 800-160 MG per tablet Reported on 3/3/2017     acetaminophen (TYLENOL) 325 MG tablet Take 325 mg  by mouth as needed.     DILTIAZEM CD CP24# 180 MG OR 1 CAPSULE DAILY     NORTRIPTYLINE HCL 10 MG OR CAPS 1 CAPSULE AT BEDTIME     ONE-A-DAY 50 PLUS OR None Entered     VITAMIN D 1000 UNIT OR TABS 1 TABLET DAILY     No current facility-administered medications for this visit.          Social History   See HPI    Family History   History reviewed. No pertinent family history.      Physical Exam     Temp Readings from Last 3 Encounters:   03/03/17 97  F (36.1  C) (Oral)   11/06/12 97.7  F (36.5  C) (Oral)   09/13/10 97.4  F (36.3  C) (Oral)     BP Readings from Last 5 Encounters:   04/27/17 124/79   03/03/17 92/64   09/12/16 114/71   09/01/16 134/83   06/02/16 110/68     Pulse Readings from Last 1 Encounters:   04/27/17 91     Resp Readings from Last 1 Encounters:   04/27/17 16     Estimated body mass index is 19.8 kg/(m^2) as calculated from the following:    Height as of this encounter: 1.524 m (5').    Weight as of this encounter: 46 kg (101 lb 6.4 oz).    GEN: NAD  HEENT: MMM. No oral lesions. Anicteric, noninjected sclera  CV: S1, S2. RRR. No lower L m/r/g.  PULM: CTA bilaterally. No w/c.  MSK: Synovial swelling of the bilateral second-third MCPs but without tenderness to palpation. Mild subluxation of the right second and third MCPs. Wrists, elbows, and shoulders without swelling or tenderness to palpation. Left hip tender to direct palpation. Right hip without tenderness to palpation.  Knees with crepitation bilaterally but no swelling or increased warmth; nontender to palpation. Ankles without swelling or tenderness to palpation. Negative MTP squeeze. Pes planus bilaterally.   SKIN: Old postsurgical changes of her nose where it appears that a skin lesion was removed   EXT: No LE edema  PSYCH: Alert. Appropriate.    Labs / Imaging (select studies)     CBC  Recent Labs   Lab Test  09/01/16   0950  06/02/16   1447  07/15/13   1522   WBC  7.4  7.2  6.6   RBC  4.47  4.74  4.40   HGB  13.2  13.9  13.8   HCT  41.7   43.4  42.5   MCV  93  92  97   RDW  14.7  16.1*  14.1   PLT  215  266  237   MCH  29.5  29.3  31.4   MCHC  31.7  32.0  32.5   NEUTROPHIL  74.8  74.6  75.8   LYMPH  15.3  15.2  15.8   MONOCYTE  8.0  7.7  6.1   EOSINOPHIL  1.2  1.7  1.8   BASOPHIL  0.7  0.8  0.5   ANEU  5.5  5.4  5.1   ALYM  1.1  1.1  1.0   MARYANN  0.6  0.6  0.4   AEOS  0.1  0.1  0.1   ABAS  0.1  0.1  0.0     CMP  Recent Labs   Lab Test  09/01/16   0950 07/15/16  06/02/16   1447  07/15/13   1522  05/17/12  05/10/10   NA   --    --    --    --    --   139   --    --    POTASSIUM   --   3.8   --    --    --   3.8   --    --    CHLORIDE   --    --    --    --    --   103   --    --    ANIONGAP   --    --    --    --    --   8.0   --    --    GLC   --   83   --    --    --   86   --    --    BUN   --    --    --    --    --   16   --    --    CR  0.84  0.79  1.10*  0.79   < >  0.78   < >  0.69@   GFRESTIMATED  63  >60  46*  69   < >   --    < >  >60@   GFRESTBLACK  76  >60  56*  83   < >   --    < >  >60@   LINDA   --    --    --    --    --   9.5   --    --    BILITOTAL  0.5   --   0.3   --    --    --    --   0.5@   ALBUMIN  3.9   --   4.0  3.7   < >   --    < >  3.9@   PROTTOTAL  6.9   --   7.1   --    --    --    --   6.6@   ALKPHOS  97   --   102   --    --    --    --   89@   AST  24  25  31  34   < >   --    < >  22@   ALT  22  22  28  24   < >   --    < >  15@    < > = values in this interval not displayed.     Calcium/VitaminD  Recent Labs   Lab Test 05/17/12   LINDA  9.5     ESR/CRP  Recent Labs   Lab Test  06/02/16   1447   SED  7   CRP  <2.9     Lipid Panel  Recent Labs   Lab Test 07/15/16 05/31/13 05/17/12   CHOL  164  131  138   TRIG  83  90  70   HDL  71  56  65   LDL  76  57  59   CHOLHDLRATIO   --   2.3  2.1      Immunization History     There is no immunization history on file for this patient.       Procedure     Procedure: Steroid injection of the left greater trochanteric bursa  Indication: Pain, greater trochanteric bursitis    The  procedure was explained in detail. Risks including infection, pain, structural damage such as cartilage damage and tendon rupture, fat atrophy, skin hyper-/hypo-pigmentation, and medication reaction was explained. The need for rest of the affected joint for one week after the procedure was explained.  The option of not doing the procedure was also provided. All questions were answered and the patient consented to the procedure.     A time-out was performed and the correct patient, procedure, and laterality were verified.    The left greater trochanteric bursa was examined and location for injection was identified - over the most tender area. The area was cleaned with chlorhexidine, twice.  Ethyl chloride was then used for topical anaesthetic.  Then a mixture of lidocaine 1% 2 mL and Depo-Medrol 40mg was injected over the most tender area in a fan-like pattern.     The patient tolerated the procedure well. No complications.    MEDICATION: Depo Medrol 40mg  LOT #: [id number]  : [employer] [facility]  EXPIRATION DATE: 10/2017  NDC#: 2644-4858-39          Chart documentation done in part with Dragon Voice recognition Software. Although reviewed after completion, some word and grammatical error may remain.    Len Vann MD    Many notes

## 2017-04-28 NOTE — PROGRESS NOTES
Rheumatology team: Please call to inform Ms. Ortez that her labs do not show evidence for medication toxicity.    Len Vann MD  4/28/2017 7:47 AM

## 2017-10-04 ENCOUNTER — OFFICE VISIT (OUTPATIENT)
Dept: RHEUMATOLOGY | Facility: CLINIC | Age: 82
End: 2017-10-04
Payer: COMMERCIAL

## 2017-10-04 VITALS
WEIGHT: 100 LBS | BODY MASS INDEX: 19.63 KG/M2 | SYSTOLIC BLOOD PRESSURE: 137 MMHG | HEART RATE: 78 BPM | DIASTOLIC BLOOD PRESSURE: 80 MMHG | OXYGEN SATURATION: 94 % | HEIGHT: 60 IN

## 2017-10-04 DIAGNOSIS — M70.62 TROCHANTERIC BURSITIS OF LEFT HIP: ICD-10-CM

## 2017-10-04 DIAGNOSIS — M06.9 RHEUMATOID ARTHRITIS INVOLVING MULTIPLE SITES, UNSPECIFIED RHEUMATOID FACTOR PRESENCE: Primary | ICD-10-CM

## 2017-10-04 PROCEDURE — 20610 DRAIN/INJ JOINT/BURSA W/O US: CPT | Mod: LT | Performed by: INTERNAL MEDICINE

## 2017-10-04 PROCEDURE — 99213 OFFICE O/P EST LOW 20 MIN: CPT | Mod: 25 | Performed by: INTERNAL MEDICINE

## 2017-10-04 RX ORDER — METHYLPREDNISOLONE ACETATE 40 MG/ML
40 INJECTION, SUSPENSION INTRA-ARTICULAR; INTRALESIONAL; INTRAMUSCULAR; SOFT TISSUE ONCE
Qty: 1 ML | Refills: 0 | OUTPATIENT
Start: 2017-10-04 | End: 2017-10-04

## 2017-10-04 NOTE — NURSING NOTE
Chief Complaint   Patient presents with     Arthritis     RA, patient states some days are good and some days are bad. Hard time getting in and out of cars       Initial /80 (BP Location: Left arm, Patient Position: Chair, Cuff Size: Adult Small)  Pulse 78  Ht 1.524 m (5')  Wt 45.4 kg (100 lb)  SpO2 94%  BMI 19.53 kg/m2 Estimated body mass index is 19.53 kg/(m^2) as calculated from the following:    Height as of this encounter: 1.524 m (5').    Weight as of this encounter: 45.4 kg (100 lb).  BP completed using cuff size: small regular         RAPID3 (0-30) Cumulative Score  12.3          RAPID3 Weighted Score (divide #4 by 3 and that is the weighted score)  4.1

## 2017-10-04 NOTE — PATIENT INSTRUCTIONS
Dr. Vann s Rheumatology Clinics  Locations Clinic Hours Telephone Number     [facility]  [address]  [place] [address]     Wednesday: 7:20AM - 4:00PM  Thursday:     7:20AM - 4:00PM     Friday:          7:20AM - 11:00AM       To schedule an appointment with  Dr. Vann,  please contact  Specialty [phone]       Aubrey Garcia  [address]  [place] [address]       Monday:       7:20AM - 4:00PM        Aubrey Donaldson  [address]  [place] [address]       Tuesday:      7:20AM - 4:00PM          Thank you!    Zina Brewster CMA

## 2017-10-04 NOTE — NURSING NOTE
The following medication was given:     MEDICATION: Depo Medrol 40mg  SITE: Left greater trochanteric bursae  DOSE: 1 ml  LOT #: [id number]  :  Adalberto Talbot  EXPIRATION DATE:  07/01/2018  NDC#: 3326-2663-03

## 2017-10-04 NOTE — PROGRESS NOTES
"Rheumatology Clinic Visit      Marisela Ortez MRN# [id number]   YOB: 1924 Age: 89 year old      Date of visit: 10/04/17   PCP: Dr. FRANTZ Robertson            [phone] (Work)            [phone] (Fax)     Chief Complaint   Patient presents with:  Arthritis: RA, patient states some days are good and some days are bad. Hard time getting in and out of cars      Assessment and Plan   1. Rheumatoid arthritis: Reportedly seropositive by record review, but I do not see lab values for rheumatoid factor or CCP. Previously on Orencia (\" I just didn't like that one and it cost too much\"). Previously on methotrexate (d/c d by pt request, no adverse effect).  Currently on  mg daily and doing well from an RA perspective.  I still do not have reports of the HCQ toxicity exam from ophthalmology; I previously gave her a business card to have her ophthalmologist send me the report when she is seen next, which is supposed to be in May or June, per patient.  - Continue hydroxychloroquine 200 mg daily  - Request ophthalmology records for toxicity monitoring    2. Bilateral patellofemoral syndrome: She went to physical therapy that made her knees feel a bit worse but she is not doing the exercises on a regular basis. She believes that she is getting enough exercise by doing her pool therapy that she has always done.       3. Left trochanteric bursitis: Previous x-rays did not reveal osteoarthritis. Previous steroid injection was effective; repeat today.     4. Dry mouth but no dry eyes: This could be a secondary phenomenon such as secondary Sjogren syndrome, due to rheumatoid arthritis. However, she has no dry eyes. I suspect that her symptoms are secondary to medications. We discussed the symptomatic treatment options. Continue frequent sips of water.    Ms. Ortez verbalized agreement with and understanding of the rational for the diagnosis and treatment plan.  All questions were answered to best of my " ability and the patient's satisfaction. Ms. Ortez was advised to contact the clinic with any questions that may arise after the clinic visit.      Thank you for involving me in the care of the patient    Return to clinic: 6 months     HPI   Marisela Ortez is a 89 year old female with a medical history significant for osteoarthritis, osteoporosis, chronic low back pain, and rheumatoid arthritis who presents for follow-up of rheumatoid arthritis.    Today, she reports that her RA is doing well. Some left hip pain.  No other joint pain.  No morning stiffness or gelling phenomenon.      Denies fevers, chills, nausea, vomiting, constipation, diarrhea. No abdominal pain. No chest pain/pressure, palpitations, or shortness of breath. No oral or nasal sores. No neck pain. No rash. No LE swelling.     Tobacco: None    EtOH: None    Drugs: None     The patient's daughter was present with her during the clinic visit today.    ROS   GEN: No fevers, chills, or night sweats  SKIN: No rashes. See history of present illness  HEENT: No oral or nasal ulcers.  CV: No chest pain, pressure, palpitations, or dyspnea on exertion.  PULM: No SOB, wheeze, cough.  GI:  No nausea, vomiting, constipation, diarrhea. No blood in stool. No abdominal pain.  : No blood in urine.  MSK: See HPI.  NEURO: No numbness, tingling, or weakness.  EXT: See history of present illness    Active Problem List     Patient Active Problem List   Diagnosis     OA (osteoarthritis)     High risk medications (not anticoagulants) long-term use     RA (rheumatoid arthritis) (H)     Nonproductive cough     Osteoporosis     Low back pain     Pain in the neck     Chronic pain of both knees     Bilateral hip pain     Past Medical History     Past Medical History:   Diagnosis Date     Inflammatory arthritis      OA (osteoarthritis) 5/18/2010     Past Surgical History     Past Surgical History:   Procedure Laterality Date     C STOMACH SURGERY PROCEDURE UNLISTED        Allergy     Allergies   Allergen Reactions     Penicillins      Current Medication List     Current Outpatient Prescriptions   Medication Sig     hydroxychloroquine (PLAQUENIL) 200 MG tablet Take 1 tablet (200 mg) by mouth daily     order for DME Equipment being ordered: [facility] Fax [phone]    Primary Dressing Majo   Qty 15  Secondary Dressing 4' dermacea roll gauze Qty 30  Secondary Dressing 2' medipore tape Qty 1  Length of Need: 1 month  Frequency of dressing change: daily     ALPRAZolam (XANAX) 0.25 MG tablet Take 0.25 mg by mouth     Calcium Carbonate (CALCIUM OYSTER SHELL) 1250 (500 CA) MG TABS Take 1 Tab by mouth Once Daily.     simvastatin (ZOCOR) 10 MG tablet TAKE 1 TABLET BY MOUTH ONCE DAILY.     sulfamethoxazole-trimethoprim (BACTRIM DS,SEPTRA DS) 800-160 MG per tablet Reported on 3/3/2017     acetaminophen (TYLENOL) 325 MG tablet Take 325 mg by mouth as needed.     DILTIAZEM CD CP24# 180 MG OR 1 CAPSULE DAILY     NORTRIPTYLINE HCL 10 MG OR CAPS 1 CAPSULE AT BEDTIME     ONE-A-DAY 50 PLUS OR None Entered     VITAMIN D 1000 UNIT OR TABS 1 TABLET DAILY     No current facility-administered medications for this visit.          Social History   See HPI    Family History   History reviewed. No pertinent family history.      Physical Exam     Temp Readings from Last 3 Encounters:   03/03/17 97  F (36.1  C) (Oral)   11/06/12 97.7  F (36.5  C) (Oral)   09/13/10 97.4  F (36.3  C) (Oral)     BP Readings from Last 5 Encounters:   10/04/17 137/80   04/27/17 124/79   03/03/17 92/64   09/12/16 114/71   09/01/16 134/83     Pulse Readings from Last 1 Encounters:   10/04/17 78     Resp Readings from Last 1 Encounters:   04/27/17 16     Estimated body mass index is 19.53 kg/(m^2) as calculated from the following:    Height as of this encounter: 1.524 m (5').    Weight as of this encounter: 45.4 kg (100 lb).    GEN: NAD  HEENT: MMM. No oral lesions. Anicteric, noninjected sclera  CV: S1, S2. RRR. No  lower L m/r/g.  PULM: CTA bilaterally. No w/c.  MSK: MCPs, PIPs, wrists, elbows, shoulders, knees, ankles, and feet without swelling or tenderness to palpation.    Mild subluxation of the right second and third MCPs. Left hip tender to direct palpation. Right hip without tenderness to palpation.  Pes planus bilaterally.   SKIN: No rash   EXT: No LE edema  PSYCH: Alert. Appropriate.    Labs / Imaging (select studies)     CBC  Recent Labs   Lab Test  04/27/17   1406  09/01/16   0950  06/02/16   1447   WBC  8.9  7.4  7.2   RBC  5.15  4.47  4.74   HGB  14.2  13.2  13.9   HCT  45.2  41.7  43.4   MCV  88  93  92   RDW  16.5*  14.7  16.1*   PLT  354  215  266   MCH  27.6  29.5  29.3   MCHC  31.4*  31.7  32.0   NEUTROPHIL  76.3  74.8  74.6   LYMPH  14.8  15.3  15.2   MONOCYTE  6.8  8.0  7.7   EOSINOPHIL  1.2  1.2  1.7   BASOPHIL  0.9  0.7  0.8   ANEU  6.8  5.5  5.4   ALYM  1.3  1.1  1.1   MARYANN  0.6  0.6  0.6   AEOS  0.1  0.1  0.1   ABAS  0.1  0.1  0.1     CMP  Recent Labs   Lab Test  04/27/17   1406  09/01/16   0950 07/15/16  06/02/16   1447  05/17/12  05/10/10   NA   --    --    --    --    --   139   --    --    POTASSIUM   --    --   3.8   --    --   3.8   --    --    CHLORIDE   --    --    --    --    --   103   --    --    ANIONGAP   --    --    --    --    --   8.0   --    --    GLC   --    --   83   --    --   86   --    --    BUN   --    --    --    --    --   16   --    --    CR  0.90  0.84  0.79  1.10*   < >  0.78   < >  0.69@   GFRESTIMATED  59*  63  >60  46*   < >   --    < >  >60@   GFRESTBLACK  71  76  >60  56*   < >   --    < >  >60@   LINDA   --    --    --    --    --   9.5   --    --    BILITOTAL  0.4  0.5   --   0.3   --    --    --   0.5@   ALBUMIN  3.9  3.9   --   4.0   < >   --    < >  3.9@   PROTTOTAL  7.4  6.9   --   7.1   --    --    --   6.6@   ALKPHOS  100  97   --   102   --    --    --   89@   AST  26  24  25  31   < >   --    < >  22@   ALT  25  22  22  28   < >   --    < >  15@    < > = values  in this interval not displayed.     Calcium/VitaminD  Recent Labs   Lab Test 05/17/12   LINDA  9.5     ESR/CRP  Recent Labs   Lab Test  04/27/17   1406  06/02/16   1447   SED  9  7   CRP  <2.9  <2.9     Lipid Panel  Recent Labs   Lab Test 07/15/16 05/31/13 05/17/12   CHOL  164  131  138   TRIG  83  90  70   HDL  71  56  65   LDL  76  57  59   CHOLHDLRATIO   --   2.3  2.1     Immunization History     There is no immunization history on file for this patient.       Procedure     Procedure: Steroid injection of the left greater trochanteric bursa  Indication: Pain, greater trochanteric bursitis    The procedure was explained in detail. Risks including infection, pain, structural damage such as cartilage damage and tendon rupture, fat atrophy, skin hyper-/hypo-pigmentation, and medication reaction was explained. The need for rest of the affected joint for one week after the procedure was explained.  The option of not doing the procedure was also provided. All questions were answered and the patient consented to the procedure.     A time-out was performed and the correct patient, procedure, and laterality were verified.    The left greater trochanteric bursa was examined and location for injection was identified - over the most tender area. The area was cleaned with chlorhexidine, twice.  Ethyl chloride was then used for topical anaesthetic.  Then a mixture of lidocaine 1% 2 mL and Depo-Medrol 40mg was injected over the most tender area in a fan-like pattern.     The patient tolerated the procedure well. No complications.    MEDICATION: Depo Medrol 40mg  LOT #: [id number]  : [employer]  EXPIRATION DATE: 07/01/2018  NDC#: 2207-5504-57           Chart documentation done in part with Dragon Voice recognition Software. Although reviewed after completion, some word and grammatical error may remain.    Len Vann MD    Many notes

## 2017-10-04 NOTE — MR AVS SNAPSHOT
After Visit Summary   10/4/2017    Marisela Ortez    MRN: [id number]           Patient Information     Date Of Birth          1924        Visit Information        Provider Department      10/4/2017 2:00 PM Len Vann MD [facility]        Care Instructions    Dr. Vann s Rheumatology Clinics  Locations Clinic Hours Telephone Number     [facility]  [address]  [place] [address]     Wednesday: 7:20AM - 4:00PM  Thursday:     7:20AM - 4:00PM     Friday:          7:20AM - 11:00AM       To schedule an appointment with  Dr. Vann,  please contact  Specialty [phone]       [facility]  [address]  [place] [address]       Monday:       7:20AM - 4:00PM        [facility]  [address] [place] [address]       Tuesday:      7:20AM - 4:00PM          Thank you!    Zina Brewster CMA              Follow-ups after your visit        Your next 10 appointments already scheduled     2018  3:00 PM CDT   Return Visit with Len Vann MD   [facility]    [address]  [place] [id number] [phone]              Who to contact     If you have questions or need follow up information about today's clinic visit or your schedule please contact [facility] directly at [phone].  Normal or non-critical lab and imaging results will be communicated to you by MyChart, letter or phone within 4 business days after the clinic has received the results. If you do not hear from us within 7 days, please contact the clinic through [employer] or phone. If you have a critical or abnormal lab result, we will notify you by phone as soon as possible.  Submit refill requests through [facility] or call your pharmacy and they will forward the refill request to us. Please allow 3 business days for your refill to be completed.          Additional Information About Your  "Visit        [employer] Information     [employer] lets you send messages to your doctor, view your test results, renew your prescriptions, schedule appointments and more. To sign up, go to [email or web address] . Click on \"Log in\" on the left side of the screen, which will take you to the Welcome page. Then click on \"Sign up Now\" on the right side of the page.     You will be asked to enter the access code listed below, as well as some personal information. Please follow the directions to create your username and password.     Your access code is: [email or web address]  Expires: 2018  2:32 PM     Your access code will  in 90 days. If you need help or a new code, please call your [facility] clinic or [phone].        Care EveryWhere ID     This is your Care EveryWhere ID. This could be used by other organizations to access your [facility] medical records  [id number]        Your Vitals Were     Pulse Height Pulse Oximetry BMI (Body Mass Index)          78 1.524 m (5') 94% 19.53 kg/m2         Blood Pressure from Last 3 Encounters:   10/04/17 137/80   17 124/79   17 92/64    Weight from Last 3 Encounters:   10/04/17 45.4 kg (100 lb)   17 46 kg (101 lb 6.4 oz)   17 45.8 kg (101 lb)              Today, you had the following     No orders found for display       Primary Care Provider Office Phone # Fax #    M [facility] [phone]       [facility] [address]  [facility] [address]        Equal Access to Services     MILDRED SANDERS : Hadii dipesh Eddy, cornelio okeefe, qarandee gonzalez. So [facility] [phone].    ATENCIÓN: Si habla español, tiene a beck disposición servicios gratuitos de asistencia lingüística. Llame al [phone].    We comply with applicable federal civil rights laws and [place] laws. We do not discriminate on the basis of race, color, national origin, age, disability, sex, sexual " orientation, or gender identity.            Thank you!     Thank you for choosing [facility]  for your care. Our goal is always to provide you with excellent care. Hearing back from our patients is one way we can continue to improve our services. Please take a few minutes to complete the written survey that you may receive in the mail after your visit with us. Thank you!             Your Updated Medication List - Protect others around you: Learn how to safely use, store and throw away your medicines at www.disposemymeds.org.          This list is accurate as of: 10/4/17  2:32 PM.  Always use your most recent med list.                   Brand Name Dispense Instructions for use Diagnosis    ALPRAZolam 0.25 MG tablet    XANAX     Take 0.25 mg by mouth        Calcium Oyster Shell 1250 (500 CA) MG Tabs      Take 1 Tab by mouth Once Daily.        cholecalciferol 1000 UNIT tablet    vitamin D     1 TABLET DAILY        DILTIAZEM CD CP24# 180 MG OR      1 CAPSULE DAILY        hydroxychloroquine 200 MG tablet    PLAQUENIL    90 tablet    Take 1 tablet (200 mg) by mouth daily    Rheumatoid arthritis involving multiple sites, unspecified rheumatoid factor presence (H)       nortriptyline 10 MG capsule    PAMELOR     1 CAPSULE AT BEDTIME        ONE-A-DAY 50 PLUS PO      None Entered        order for DME     30 days    Equipment being ordered: [facility] Fax [phone]  Primary Dressing Majo   Qty 15 Secondary Dressing 4' dermacea roll gauze Qty 30 Secondary Dressing 2' medipore tape Qty 1 Length of Need: 1 month Frequency of dressing change: daily    Open wound of knee, leg, and ankle, left, subsequent encounter       simvastatin 10 MG tablet    ZOCOR     TAKE 1 TABLET BY MOUTH ONCE DAILY.        sulfamethoxazole-trimethoprim 800-160 MG per tablet    BACTRIM DS/SEPTRA DS     Reported on 3/3/2017        TYLENOL 325 MG tablet   Generic drug:  acetaminophen      Take 325 mg by mouth as needed.

## 2018-05-09 ENCOUNTER — OFFICE VISIT (OUTPATIENT)
Dept: RHEUMATOLOGY | Facility: CLINIC | Age: 83
End: 2018-05-09
Payer: COMMERCIAL

## 2018-05-09 VITALS
HEIGHT: 60 IN | BODY MASS INDEX: 20.42 KG/M2 | HEART RATE: 84 BPM | SYSTOLIC BLOOD PRESSURE: 128 MMHG | WEIGHT: 104 LBS | DIASTOLIC BLOOD PRESSURE: 79 MMHG | TEMPERATURE: 97.3 F

## 2018-05-09 DIAGNOSIS — M06.9 RHEUMATOID ARTHRITIS, INVOLVING UNSPECIFIED SITE, UNSPECIFIED RHEUMATOID FACTOR PRESENCE: Primary | ICD-10-CM

## 2018-05-09 PROCEDURE — 99213 OFFICE O/P EST LOW 20 MIN: CPT | Performed by: INTERNAL MEDICINE

## 2018-05-09 NOTE — PATIENT INSTRUCTIONS
Rheumatology    Dr. Len Vann         [facility]   (Monday)  [address]  [place] [address]       [facility]   (Tuesday)  [address]  [place] [address]    [facility]   (Wed., Thurs., and Friday)  [address] [place] [address]    Phone number: [phone]  Thank you for choosing [facility].  Zina Brewster CMA

## 2018-05-09 NOTE — PROGRESS NOTES
"Rheumatology Clinic Visit      Marisela Ortez MRN# [id number]   YOB: 1924 Age: 89 year old      Date of visit: 5/09/18   PCP: Dr. FRANTZ Robertson            [phone] (Work)            [phone] (Fax)     Chief Complaint   Patient presents with:  RECHECK: RA; bilateral knee & leg swelling      Assessment and Plan   1. Rheumatoid arthritis: Reportedly seropositive by record review, but I do not see lab values for rheumatoid factor or CCP. Previously on Orencia (\" I just didn't like that one and it cost too much\"). Previously on methotrexate (d/c d by pt request, no adverse effect).  Currently on  mg daily and doing well from an RA perspective.  I still do not have reports of the HCQ toxicity exam from ophthalmology; I previously gave her a business card to have her ophthalmologist send me the report when she is seen next and we requested but have not received the records.  Advised her that hydroxychloroquine would not be refilled until the records are received showing no hydroxychloroquine toxicity; last Rx'd hydroxychloroquine in March 2017.  - Request ophthalmology records for toxicity monitoring    2. Bilateral patellofemoral syndrome: On and off issue; not an issue today.       3. Left trochanteric bursitis, history: improved with previous steroid injection    4. Dry mouth but no dry eyes: This could be a secondary phenomenon such as secondary Sjogren syndrome, due to rheumatoid arthritis.  Continue frequent sips of water and dentist f/u.    5. Bilateral lower extremity edema: Ms. Ortez reports following with her PCP for this issue and plans to start wearing compression stockings.     Ms. Ortez verbalized agreement with and understanding of the rational for the diagnosis and treatment plan.  All questions were answered to best of my ability and the patient's satisfaction. Ms. Ortez was advised to contact the clinic with any questions that may arise after the clinic visit.  "     Thank you for involving me in the care of the patient    Return to clinic: 6 months, sooner if needed    HPI   Marisela Ortez is a 89 year old female with a medical history significant for osteoarthritis, osteoporosis, chronic low back pain, and rheumatoid arthritis who presents for follow-up of rheumatoid arthritis.    Today, she reports that her RA is doing well. Steroid injection of the left trochanteric bursa at the previous visit was very helpful.  No joint pain.  Has swelling of her bilateral lower extremities and was advised by her PCP to wear compression stockings but she has not yet started doing this.       Denies fevers, chills, nausea, vomiting, constipation, diarrhea. No abdominal pain. No chest pain/pressure, palpitations, or shortness of breath. No oral or nasal sores. No neck pain. No rash. No LE swelling.     Tobacco: None    EtOH: None    Drugs: None     ROS   GEN: No fevers, chills, or night sweats  SKIN: No rashes. See history of present illness  HEENT: No oral or nasal ulcers.  CV: No chest pain, pressure, palpitations, or dyspnea on exertion.  PULM: No SOB, wheeze, cough.  GI:  No nausea, vomiting, constipation, diarrhea. No blood in stool. No abdominal pain.  : No blood in urine.  MSK: See HPI.  NEURO: No numbness, tingling, or weakness.  EXT: See history of present illness    Active Problem List     Patient Active Problem List   Diagnosis     OA (osteoarthritis)     High risk medications (not anticoagulants) long-term use     RA (rheumatoid arthritis) (H)     Nonproductive cough     Osteoporosis     Low back pain     Pain in the neck     Chronic pain of both knees     Bilateral hip pain     Past Medical History     Past Medical History:   Diagnosis Date     Inflammatory arthritis      OA (osteoarthritis) 5/18/2010     Past Surgical History     Past Surgical History:   Procedure Laterality Date     C STOMACH SURGERY PROCEDURE UNLISTED       Allergy     Allergies   Allergen Reactions      Penicillins      Current Medication List     Current Outpatient Prescriptions   Medication Sig     Calcium Carbonate (CALCIUM OYSTER SHELL) 1250 (500 CA) MG TABS Take 1 Tab by mouth Once Daily.     DILTIAZEM CD CP24# 180 MG OR 1 CAPSULE DAILY     hydroxychloroquine (PLAQUENIL) 200 MG tablet Take 1 tablet (200 mg) by mouth daily     NORTRIPTYLINE HCL 10 MG OR CAPS 1 CAPSULE AT BEDTIME     simvastatin (ZOCOR) 10 MG tablet TAKE 1 TABLET BY MOUTH ONCE DAILY.     VITAMIN D 1000 UNIT OR TABS 1 TABLET DAILY     acetaminophen (TYLENOL) 325 MG tablet Take 325 mg by mouth as needed.     ALPRAZolam (XANAX) 0.25 MG tablet Take 0.25 mg by mouth     ONE-A-DAY 50 PLUS OR None Entered     order for DME Equipment being ordered: [employer] [facility] Fax [phone]    Primary Dressing Majo   Qty 15  Secondary Dressing 4' dermacea roll gauze Qty 30  Secondary Dressing 2' medipore tape Qty 1  Length of Need: 1 month  Frequency of dressing change: daily     sulfamethoxazole-trimethoprim (BACTRIM DS,SEPTRA DS) 800-160 MG per tablet Reported on 3/3/2017     No current facility-administered medications for this visit.          Social History   See HPI    Family History   No family history on file.      Physical Exam     Temp Readings from Last 3 Encounters:   05/09/18 97.3  F (36.3  C) (Oral)   03/03/17 97  F (36.1  C) (Oral)   11/06/12 97.7  F (36.5  C) (Oral)     BP Readings from Last 5 Encounters:   05/09/18 128/79   10/04/17 137/80   04/27/17 124/79   03/03/17 92/64   09/12/16 114/71     Pulse Readings from Last 1 Encounters:   05/09/18 84     Resp Readings from Last 1 Encounters:   04/27/17 16     Estimated body mass index is 20.31 kg/(m^2) as calculated from the following:    Height as of this encounter: 1.524 m (5').    Weight as of this encounter: 47.2 kg (104 lb).    GEN: NAD  HEENT: MMM. No oral lesions. Anicteric, noninjected sclera  CV: S1, S2. RRR. No lower L m/r/g.  PULM: CTA bilaterally. No w/c.  MSK: MCPs, PIPs,  wrists, elbows, shoulders, knees, ankles, and feet without swelling or tenderness to palpation.    Mild subluxation of the right second and third MCPs. Hips nontender to palpation. Pes planus bilaterally.   SKIN: No rash   EXT: Nonpitting edema of the bilateral LE distal to the mid-calves  PSYCH: Alert. Appropriate.    Labs / Imaging (select studies)     CBC  Recent Labs   Lab Test  04/27/17   1406  09/01/16   0950  06/02/16   1447   WBC  8.9  7.4  7.2   RBC  5.15  4.47  4.74   HGB  14.2  13.2  13.9   HCT  45.2  41.7  43.4   MCV  88  93  92   RDW  16.5*  14.7  16.1*   PLT  354  215  266   MCH  27.6  29.5  29.3   MCHC  31.4*  31.7  32.0   NEUTROPHIL  76.3  74.8  74.6   LYMPH  14.8  15.3  15.2   MONOCYTE  6.8  8.0  7.7   EOSINOPHIL  1.2  1.2  1.7   BASOPHIL  0.9  0.7  0.8   ANEU  6.8  5.5  5.4   ALYM  1.3  1.1  1.1   MARYANN  0.6  0.6  0.6   AEOS  0.1  0.1  0.1   ABAS  0.1  0.1  0.1     CMP  Recent Labs   Lab Test  04/27/17   1406  09/01/16   0950 07/15/16  06/02/16   1447  05/17/12  05/10/10   NA   --    --    --    --    --   139   --    --    POTASSIUM   --    --   3.8   --    --   3.8   --    --    CHLORIDE   --    --    --    --    --   103   --    --    ANIONGAP   --    --    --    --    --   8.0   --    --    GLC   --    --   83   --    --   86   --    --    BUN   --    --    --    --    --   16   --    --    CR  0.90  0.84  0.79  1.10*   < >  0.78   < >  0.69@   GFRESTIMATED  59*  63  >60  46*   < >   --    < >  >60@   GFRESTBLACK  71  76  >60  56*   < >   --    < >  >60@   LINDA   --    --    --    --    --   9.5   --    --    BILITOTAL  0.4  0.5   --   0.3   --    --    --   0.5@   ALBUMIN  3.9  3.9   --   4.0   < >   --    < >  3.9@   PROTTOTAL  7.4  6.9   --   7.1   --    --    --   6.6@   ALKPHOS  100  97   --   102   --    --    --   89@   AST  26  24  25  31   < >   --    < >  22@   ALT  25  22  22  28   < >   --    < >  15@    < > = values in this interval not displayed.     Calcium/VitaminD  Recent Labs    Lab Test 05/17/12   LINDA  9.5     ESR/CRP  Recent Labs   Lab Test  04/27/17   1406  06/02/16   1447   SED  9  7   CRP  <2.9  <2.9     Immunization History     There is no immunization history on file for this patient.          Chart documentation done in part with Dragon Voice recognition Software. Although reviewed after completion, some word and grammatical error may remain.    Len Vann MD    Many notes

## 2018-05-09 NOTE — MR AVS SNAPSHOT
After Visit Summary   5/9/2018    Marisela Ortez    MRN: [id number]           Patient Information     Date Of Birth          8/17/1924        Visit Information        Provider Department      5/9/2018 3:20 PM Len Vann MD [facility]        Care Instructions    Rheumatology    Dr. Len [facility]   (Monday)  [address]  [place] [address]       [facility]   (Tuesday)  [address] [place] [address]    [facility]   (Wed., Thurs., and Friday)  [address] [place] [address]    Phone number: [phone]  Thank you for choosing [facility].  Zina Brewster CMA            Follow-ups after your visit        Your next 10 appointments already scheduled     May 09, 2018  3:20 PM CDT   [facility] with Len Vann MD   [facility]    [address] [facility] [address] [phone]            Nov 08, 2018 10:00 AM CST   Return Visit with Len Vann MD   [facility]    [address] [facility] [address]   [phone]              Who to contact     If you have questions or need follow up information about today's clinic visit or your schedule please contact [facility] directly at [phone].  Normal or non-critical lab and imaging results will be communicated to you by MyChart, letter or phone within 4 business days after the clinic has received the results. If you do not hear from us within 7 days, please contact the clinic through MyChart or phone. If you have a critical or abnormal lab result, we will notify you by phone as soon as possible.  Submit refill requests through [employer] or call your pharmacy and they will forward the refill request to us. Please allow 3 business days for your refill to be completed.          Additional Information About Your Visit        MyChart Information     [facility]  "lets you send messages to your doctor, view your test results, renew your prescriptions, schedule appointments and more. To sign up, go to [email or web address] . Click on \"Log in\" on the left side of the screen, which will take you to the Welcome page. Then click on \"Sign up Now\" on the right side of the page.     You will be asked to enter the access code listed below, as well as some personal information. Please follow the directions to create your username and password.     Your access code is: [email or web address]  Expires: 2018  3:13 PM     Your access code will  in 90 days. If you need help or a new code, please call your [facility] clinic or [phone].        Care EveryWhere ID     This is your Care EveryWhere ID. This could be used by other organizations to access your [facility] medical records  [id number]        Your Vitals Were     Pulse Temperature Height BMI (Body Mass Index)          84 97.3  F (36.3  C) (Oral) 1.524 m (5') 20.31 kg/m2         Blood Pressure from Last 3 Encounters:   18 128/79   10/04/17 137/80   17 124/79    Weight from Last 3 Encounters:   18 47.2 kg (104 lb)   10/04/17 45.4 kg (100 lb)   17 46 kg (101 lb 6.4 oz)              Today, you had the following     No orders found for display       Primary Care Provider Office Phone # Fax #    M Lili Gaminopanchito [phone]       [facility] [address]  [facility] [address]        Equal Access to Services     MILDRED SANDERS : Hadfelice Eddy, cornelio okeefe, randee ledezma. So [facility] [phone].    ATENCIÓN: Si habla español, tiene a beck disposición servicios gratuitos de asistencia lingüística. Llame al [phone].    We comply with applicable federal civil rights laws and [place] laws. We do not discriminate on the basis of race, color, national origin, age, disability, sex, sexual orientation, or gender " identity.            Thank you!     Thank you for choosing [facility]  for your care. Our goal is always to provide you with excellent care. Hearing back from our patients is one way we can continue to improve our services. Please take a few minutes to complete the written survey that you may receive in the mail after your visit with us. Thank you!             Your Updated Medication List - Protect others around you: Learn how to safely use, store and throw away your medicines at www.disposemymeds.org.          This list is accurate as of 5/9/18  3:13 PM.  Always use your most recent med list.                   Brand Name Dispense Instructions for use Diagnosis    ALPRAZolam 0.25 MG tablet    XANAX     Take 0.25 mg by mouth        Calcium Oyster Shell 1250 (500 Ca) MG Tabs      Take 1 Tab by mouth Once Daily.        cholecalciferol 1000 UNIT tablet    vitamin D3     1 TABLET DAILY        DILTIAZEM CD CP24# 180 MG OR      1 CAPSULE DAILY        hydroxychloroquine 200 MG tablet    PLAQUENIL    90 tablet    Take 1 tablet (200 mg) by mouth daily    Rheumatoid arthritis involving multiple sites, unspecified rheumatoid factor presence (H)       nortriptyline 10 MG capsule    PAMELOR     1 CAPSULE AT BEDTIME        ONE-A-DAY 50 PLUS PO      None Entered        order for DME     30 days    Equipment being ordered: [facility] Fax [phone]  Primary Dressing Majo   Qty 15 Secondary Dressing 4' dermacea roll gauze Qty 30 Secondary Dressing 2' medipore tape Qty 1 Length of Need: 1 month Frequency of dressing change: daily    Open wound of knee, leg, and ankle, left, subsequent encounter       simvastatin 10 MG tablet    ZOCOR     TAKE 1 TABLET BY MOUTH ONCE DAILY.        sulfamethoxazole-trimethoprim 800-160 MG per tablet    BACTRIM DS/SEPTRA DS     Reported on 3/3/2017        TYLENOL 325 MG tablet   Generic drug:  acetaminophen      Take 325 mg by mouth as needed.

## 2018-05-09 NOTE — PROGRESS NOTES
Chief Complaint   Patient presents with     RECHECK     RA; bilateral knee & leg swelling       Initial /79 (BP Location: Left arm, Patient Position: Sitting, Cuff Size: Adult Regular)  Pulse 84  Temp 97.3  F (36.3  C) (Oral)  Ht 1.524 m (5')  Wt 47.2 kg (104 lb)  BMI 20.31 kg/m2 Estimated body mass index is 20.31 kg/(m^2) as calculated from the following:    Height as of this encounter: 1.524 m (5').    Weight as of this encounter: 47.2 kg (104 lb).  BP completed using cuff size: regular         RAPID3 (0-30) Cumulative Score  14.3          RAPID3 Weighted Score (divide #4 by 3 and that is the weighted score)  4.7

## 2018-10-31 ENCOUNTER — OFFICE VISIT (OUTPATIENT)
Dept: PODIATRY | Facility: CLINIC | Age: 83
End: 2018-10-31
Payer: COMMERCIAL

## 2018-10-31 VITALS
HEART RATE: 89 BPM | DIASTOLIC BLOOD PRESSURE: 85 MMHG | SYSTOLIC BLOOD PRESSURE: 130 MMHG | HEIGHT: 60 IN | BODY MASS INDEX: 19.63 KG/M2 | WEIGHT: 100 LBS

## 2018-10-31 DIAGNOSIS — M77.50 TENDONITIS OF ANKLE: ICD-10-CM

## 2018-10-31 DIAGNOSIS — B35.1 ONYCHOMYCOSIS: Primary | ICD-10-CM

## 2018-10-31 DIAGNOSIS — M21.41 PES PLANUS OF BOTH FEET: ICD-10-CM

## 2018-10-31 DIAGNOSIS — M21.42 PES PLANUS OF BOTH FEET: ICD-10-CM

## 2018-10-31 PROCEDURE — 11721 DEBRIDE NAIL 6 OR MORE: CPT | Mod: GA | Performed by: PODIATRIST

## 2018-10-31 PROCEDURE — 99203 OFFICE O/P NEW LOW 30 MIN: CPT | Mod: 25 | Performed by: PODIATRIST

## 2018-10-31 ASSESSMENT — PAIN SCALES - GENERAL: PAINLEVEL: MODERATE PAIN (5)

## 2018-10-31 NOTE — LETTER
10/31/2018         RE: Marisela Ortez  [address] [facility] [address]        Dear Colleague,    Thank you for referring your patient, Marisela Ortez, to the [facility]. Please see a copy of my visit note below.    PATIENT HISTORY:  Marisela Ortez is a 89 year old female who presents to clinic for b/l toenail cutting.  Also reports longstanding R medial ankle pain with standing.  She is wearing poor shoes.  No treatments reported.  5/10 pain.      Review of Systems:  Patient denies fever, chills, rash, wound, stiffness, weakness, heart burn, blood in stool, chest pain with activity, calf pain when walking, shortness of breath with activity, chronic cough, easy bleeding/bruising, swelling of ankles, excessive thirst, fatigue.  Patient admits to limping, numbness, depression, anxiety.     PAST MEDICAL HISTORY:   Past Medical History:   Diagnosis Date     Inflammatory arthritis      OA (osteoarthritis) 5/18/2010        PAST SURGICAL HISTORY:   Past Surgical History:   Procedure Laterality Date     C STOMACH SURGERY PROCEDURE UNLISTED          MEDICATIONS:   Current Outpatient Prescriptions:      acetaminophen (TYLENOL) 325 MG tablet, Take 325 mg by mouth as needed., Disp: , Rfl:      ALPRAZolam (XANAX) 0.25 MG tablet, Take 0.25 mg by mouth, Disp: , Rfl:      Calcium Carbonate (CALCIUM OYSTER SHELL) 1250 (500 CA) MG TABS, Take 1 Tab by mouth Once Daily., Disp: , Rfl:      DILTIAZEM CD CP24# 180 MG OR, 1 CAPSULE DAILY, Disp: , Rfl:      hydroxychloroquine (PLAQUENIL) 200 MG tablet, Take 1 tablet (200 mg) by mouth daily, Disp: 90 tablet, Rfl: 1     NORTRIPTYLINE HCL 10 MG OR CAPS, 1 CAPSULE AT BEDTIME, Disp: , Rfl:      ONE-A-DAY 50 PLUS OR, None Entered, Disp: , Rfl:      order for DME, Equipment being ordered: [facility] [facility] Fax [phone]  Primary Dressing Majo   Qty 15 Secondary Dressing 4' dermacea roll gauze Qty 30 Secondary Dressing 2' medipore tape Qty 1 Length of  Need: 1 month Frequency of dressing change: daily, Disp: 30 days, Rfl: 0     simvastatin (ZOCOR) 10 MG tablet, TAKE 1 TABLET BY MOUTH ONCE DAILY., Disp: , Rfl:      sulfamethoxazole-trimethoprim (BACTRIM DS,SEPTRA DS) 800-160 MG per tablet, Reported on 3/3/2017, Disp: , Rfl:      VITAMIN D 1000 UNIT OR TABS, 1 TABLET DAILY, Disp: , Rfl:      ALLERGIES:    Allergies   Allergen Reactions     Penicillins         SOCIAL HISTORY:   Social History     Social History     Marital status:      Spouse name: N/A     Number of children: N/A     Years of education: N/A     Occupational History     Not on file.     Social History Main Topics     Smoking status: Former Smoker     Smokeless tobacco: Never Used      Comment: quit many years ago      Alcohol use No     Drug use: No     Sexual activity: No     Other Topics Concern     Not on file     Social History Narrative        FAMILY HISTORY: No pertinent family history.     EXAM:Vitals: /85  Pulse 89  Ht 5' (1.524 m)  Wt 100 lb (45.4 kg)  BMI 19.53 kg/m2  BMI= Body mass index is 19.53 kg/(m^2).    General appearance: Patient is alert and fully cooperative with history & exam.  No sign of distress is noted during the visit.     Psychiatric: Affect is pleasant & appropriate.  Patient appears motivated to improve health.     Respiratory: Breathing is regular & unlabored while sitting.     HEENT: Hearing is intact to spoken word.  Speech is clear.  No gross evidence of visual impairment that would impact ambulation.     Dermatologic: b/l toenails elongated, dystrophic, discolored, thickened.  Pain with pressure.  Skin is intact to both lower extremities without significant lesions, rash or abrasion.  No paronychia or evidence of soft tissue infection is noted.  Atrophic skin to feet.     Vascular: DP & PT pulses are 1/4.  No significant edema or varicosities noted.  CFT and skin temperature are normal to both lower extremities.     Neurologic: Lower extremity  sensation is intact to light touch, but pt reports numbness to toes.     Musculoskeletal:  Pes planus b/l.  Mild pain with palpation along R PT tendon. Patient is ambulatory without assistive device or brace.  No gross ankle deformity noted.  No foot or ankle joint effusion is noted.     ASSESSMENT:   B/l onychomycosis  R medial ankle pain consistent with flatfoot related posterior tibial tendonitis     PLAN:  Reviewed patient's chart in epic.  Discussed condition and treatment options including pros and cons.    Discussed causes of nail fungus.  Discussed treatment options with patient and explained that there isn't one treatment that is 100% effective.  Debridement is an option.  Discussed oral lamisil which is the most effective but can have liver effects (not advised).  Discussed topical options, which are less effective.    She requested debridement.  ABN signed.  Reviewed potential costs.  I debrided the nails x10 with a nail nipper.  I explained I normally don't do routine nail care.  List of resources for this given for future needs.    Discussed flatfoot/tendonitis.  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  Advised improved shoes.  Superfeet inserts advised.  RICE prn.    F/u prn.    Andrae Mendez DPM, FACFAS    Weight management plan Patient was referred to their PCP to discuss a diet and exercise plan.     Patient to follow up with Primary Care provider regarding elevated blood pressure.        Again, thank you for allowing me to participate in the care of your patient.        Sincerely,        Andrae Mendez DPM

## 2018-10-31 NOTE — PATIENT INSTRUCTIONS
Thank you for choosing [facility]!    Follow up as needed     DR. REDDING'S CLINIC LOCATIONS     MONDAY  [place] TUESDAY & FRIDAY AM  [place]   [address]   [address]   [place] [address] [place] [address]   [phone]  [phone]  [phone]       [facility]NESDAY  [facility] [phone]   [address] [facility] APPOINTMENTS: [phone]   [place] [address] BILLING QUESTIONS: [phone]           ROUTINE FOOT CARE NURSES  [facility]  [phone] [facility]  [phone]   [facility]  [phone]  [place] [phone]  [place]   [facility]  [phone]  [facility] [phone]       Please call one of the above companies for insurance coverage, cost, and location information.    NAIL FUNGUS / ONYCHOMYCOSIS   Nail fungus is not a hygiene problem and will not likely lead to significant medical   problems. The nails may get thick causing pain and possibly local skin infection.   Treatments include debridement (trimming), oral antifungals, topical antifungals and complete removal of the nail. Most fungal nails are not treated.   Topicals such as tea tree oil can be helpful for surface fungus and may, at best, limit   progression. Over the counter creams (such as Lamisil) can also be used however, their effectiveness is also quite low.  Topical treatment with Pen lac is expensive and often not covered by insurance. Pen lac has an approximate 8% success rate. Topical therapy recommendations is to apply twice a day for at least 3-4 months as it takes 9 months for new nail to grow out.    Experts suggest soaking your feet for 15 to 20 minutes in a mixture of 1 cup vinegar to 4 cups warm water. Be sure to rinse well and pat your feet dry when you're done. You can soak your feet  like this daily. But if your skin becomes irritated, try soaking only two to three times a week. Vicks VapoRub, as with vinegar, there have been no controlled clinical trials to assess the effectiveness of Vicks VapoRub on nail fungus, but there have been numerous anecdotal reports that it works. There's no consensus on how often to apply this product, so check with your doctor before using it on your nails.     Oral therapies include Sporanox and Lamisil. Oral therapies are also expensive and not very effective. Side effects such as liver disease are the main concern. Return of fungus is common even if the treatment worked.     Other Tips:  - Penlac nail medication apply daily x 4 months; remove old polish first day of each week  - Antifungal cream/powder (Zeasorb) - apply daily to feet and shoes x 2 months  - Clean shoes with Lysol or in washing machine every few weeks  - Rotate shoe gear; give them 24 hours to dry out between days wearing them  - Clean pair of socks in morning, clean pair in afternoon if your feet sweat  - Shower shoes used in public showers/pools  OVER THE COUNTER INSERTS    [employer] Fit [employer]   Power Step   Walk-Fit Arch Cradles     Most of these can be found at your local [employer], [employer], sporting Shoes4you, or online:    1.  https://www.Smappo.TraNet'te/en-us/  2.  Https://[employer].TraNet'te/  3.  Https://www.Pocitsteps.TraNet'te/    **A good high quality over the counter insert should cost around $40-$50            PRICE THERAPY  Many aches and pains throughout the foot and ankle can be helped with many simple treatments. This is usually described as PRICE Therapy.      P - Protection - often times, inflammation/pain in the lower extremity is not able to improve simply because the areas involved are never allowed to rest. Every step we take can bother the problematic area. Protecting those areas is an important step in the healing process. This may involve a walking  cast boot, a special insert/orthotic device, an ankle brace, or simply avoiding barefoot walking.    R - Rest - in addition to protecting the foot/ankle, resting is an important, but often times difficult, treatment option. Getting off your feet when they bother you, and specifically avoiding activities that cause pain/discomfort, are very beneficial to prevent, and treat, foot/ankle pain.      I - Ice - icing regularly can help to decrease inflammation and swelling in the foot, thus decreasing pain. Using an ice pack or a bag of frozen veggies works very well. Ice for 20 minutes multiple times per day as needed.  Do not place the ice directly on the skin as this can cause tissue damage.    C - Compression - using a compression wrap or an ACE wrap can help to decrease swelling, which can help to decrease pain. Wearing the wraps is generally not needed at night, but they should be worn on a regular basis when you are going to be on your feet for prolonged periods as gravity tends to pull fluids down to your feet/ankles.    E - Elevation - elevating your lower extremities multiple times daily for 15-20 minutes can help to decrease swelling, which works well in decreasing pain levels.

## 2018-10-31 NOTE — PROGRESS NOTES
PATIENT HISTORY:  Marisela Ortez is a 89 year old female who presents to clinic for b/l toenail cutting.  Also reports longstanding R medial ankle pain with standing.  She is wearing poor shoes.  No treatments reported.  5/10 pain.      Review of Systems:  Patient denies fever, chills, rash, wound, stiffness, weakness, heart burn, blood in stool, chest pain with activity, calf pain when walking, shortness of breath with activity, chronic cough, easy bleeding/bruising, swelling of ankles, excessive thirst, fatigue.  Patient admits to limping, numbness, depression, anxiety.     PAST MEDICAL HISTORY:   Past Medical History:   Diagnosis Date     Inflammatory arthritis      OA (osteoarthritis) 5/18/2010        PAST SURGICAL HISTORY:   Past Surgical History:   Procedure Laterality Date     C STOMACH SURGERY PROCEDURE UNLISTED          MEDICATIONS:   Current Outpatient Prescriptions:      acetaminophen (TYLENOL) 325 MG tablet, Take 325 mg by mouth as needed., Disp: , Rfl:      ALPRAZolam (XANAX) 0.25 MG tablet, Take 0.25 mg by mouth, Disp: , Rfl:      Calcium Carbonate (CALCIUM OYSTER SHELL) 1250 (500 CA) MG TABS, Take 1 Tab by mouth Once Daily., Disp: , Rfl:      DILTIAZEM CD CP24# 180 MG OR, 1 CAPSULE DAILY, Disp: , Rfl:      hydroxychloroquine (PLAQUENIL) 200 MG tablet, Take 1 tablet (200 mg) by mouth daily, Disp: 90 tablet, Rfl: 1     NORTRIPTYLINE HCL 10 MG OR CAPS, 1 CAPSULE AT BEDTIME, Disp: , Rfl:      ONE-A-DAY 50 PLUS OR, None Entered, Disp: , Rfl:      order for DME, Equipment being ordered: [facility] Fax [phone]  Primary Dressing Majo   Qty 15 Secondary Dressing 4' dermacea roll gauze Qty 30 Secondary Dressing 2' medipore tape Qty 1 Length of Need: 1 month Frequency of dressing change: daily, Disp: 30 days, Rfl: 0     simvastatin (ZOCOR) 10 MG tablet, TAKE 1 TABLET BY MOUTH ONCE DAILY., Disp: , Rfl:      sulfamethoxazole-trimethoprim (BACTRIM DS,SEPTRA DS) 800-160 MG per tablet, Reported on  3/3/2017, Disp: , Rfl:      VITAMIN D 1000 UNIT OR TABS, 1 TABLET DAILY, Disp: , Rfl:      ALLERGIES:    Allergies   Allergen Reactions     Penicillins         SOCIAL HISTORY:   Social History     Social History     Marital status:      Spouse name: N/A     Number of children: N/A     Years of education: N/A     Occupational History     Not on file.     Social History Main Topics     Smoking status: Former Smoker     Smokeless tobacco: Never Used      Comment: quit many years ago      Alcohol use No     Drug use: No     Sexual activity: No     Other Topics Concern     Not on file     Social History Narrative        FAMILY HISTORY: No pertinent family history.     EXAM:Vitals: /85  Pulse 89  Ht 5' (1.524 m)  Wt 100 lb (45.4 kg)  BMI 19.53 kg/m2  BMI= Body mass index is 19.53 kg/(m^2).    General appearance: Patient is alert and fully cooperative with history & exam.  No sign of distress is noted during the visit.     Psychiatric: Affect is pleasant & appropriate.  Patient appears motivated to improve health.     Respiratory: Breathing is regular & unlabored while sitting.     HEENT: Hearing is intact to spoken word.  Speech is clear.  No gross evidence of visual impairment that would impact ambulation.     Dermatologic: b/l toenails elongated, dystrophic, discolored, thickened.  Pain with pressure.  Skin is intact to both lower extremities without significant lesions, rash or abrasion.  No paronychia or evidence of soft tissue infection is noted.  Atrophic skin to feet.     Vascular: DP & PT pulses are 1/4.  No significant edema or varicosities noted.  CFT and skin temperature are normal to both lower extremities.     Neurologic: Lower extremity sensation is intact to light touch, but pt reports numbness to toes.     Musculoskeletal:  Pes planus b/l.  Mild pain with palpation along R PT tendon. Patient is ambulatory without assistive device or brace.  No gross ankle deformity noted.  No foot or ankle  joint effusion is noted.     ASSESSMENT:   B/l onychomycosis  R medial ankle pain consistent with flatfoot related posterior tibial tendonitis     PLAN:  Reviewed patient's chart in epic.  Discussed condition and treatment options including pros and cons.    Discussed causes of nail fungus.  Discussed treatment options with patient and explained that there isn't one treatment that is 100% effective.  Debridement is an option.  Discussed oral lamisil which is the most effective but can have liver effects (not advised).  Discussed topical options, which are less effective.    She requested debridement.  ABN signed.  Reviewed potential costs.  I debrided the nails x10 with a nail nipper.  I explained I normally don't do routine nail care.  List of resources for this given for future needs.    Discussed flatfoot/tendonitis.  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  Advised improved shoes.  Superfeet inserts advised.  RICE prn.    F/u prn.    Andrae Mendez DPM, FACFAS    Weight management plan Patient was referred to their PCP to discuss a diet and exercise plan.     Patient to follow up with Primary Care provider regarding elevated blood pressure.

## 2018-10-31 NOTE — MR AVS SNAPSHOT
After Visit Summary   10/31/2018    Marisela Ortez    MRN: [id number]           Patient Information     Date Of Birth          8/17/1924        Visit Information        Provider Department      10/31/2018 10:15 AM Andrae Redding DPM [facility]        Today's Diagnoses     Onychomycosis    -  1    Pes planus of both feet        Tendonitis of ankle          Care Instructions    Thank you for choosing [facility]!    Follow up as needed     DR. REDDING'S CLINIC LOCATIONS     MONDAY  [place] TUESDAY & FRIDAY AM  [place]   [address] [facility]   [address] [place] [address] [address]   [place] [address] [place] [address]   [phone]  [phone]       WEDNESDAY  [place] SCHEDULE SURGERY: [phone]   [address] [place] [address] APPOINTMENTS: [phone]   [place] [address] BILLING QUESTIONS: [phone]           ROUTINE FOOT CARE NURSES  [facility]  [phone] [facility]  [phone]   [facility]  [phone]  [place] [phone]  [place]   [facility] [facility]  [phone]  [place] [phone]       Please call one of the above companies for insurance coverage, cost, and location information.    NAIL FUNGUS / ONYCHOMYCOSIS   Nail fungus is not a hygiene problem and will not likely lead to significant medical   problems. The nails may get thick causing pain and possibly local skin infection.   Treatments include debridement (trimming), oral antifungals, topical antifungals and complete removal of the nail. Most fungal nails are not treated.   Topicals such as tea tree oil can be helpful for surface fungus and may, at best, limit   progression. Over the counter creams (such as Lamisil) can also be used however, their effectiveness is also quite low.  Topical treatment with Pen  lac is expensive and often not covered by insurance. Pen lac has an approximate 8% success rate. Topical therapy recommendations is to apply twice a day for at least 3-4 months as it takes 9 months for new nail to grow out.    Experts suggest soaking your feet for 15 to 20 minutes in a mixture of 1 cup vinegar to 4 cups warm water. Be sure to rinse well and pat your feet dry when you're done. You can soak your feet like this daily. But if your skin becomes irritated, try soaking only two to three times a week. Vicks VapoRub, as with vinegar, there have been no controlled clinical trials to assess the effectiveness of Vicks VapoRub on nail fungus, but there have been numerous anecdotal reports that it works. There's no consensus on how often to apply this product, so check with your doctor before using it on your nails.     Oral therapies include Sporanox and Lamisil. Oral therapies are also expensive and not very effective. Side effects such as liver disease are the main concern. Return of fungus is common even if the treatment worked.     Other Tips:  - Penlac nail medication apply daily x 4 months; remove old polish first day of each week  - Antifungal cream/powder (Zeasorb) - apply daily to feet and shoes x 2 months  - Clean shoes with Lysol or in washing machine every few weeks  - Rotate shoe gear; give them 24 hours to dry out between days wearing them  - Clean pair of socks in morning, clean pair in afternoon if your feet sweat  - Shower shoes used in public showers/pools  OVER THE COUNTER INSERTS    [employer] Fit UrbnDesignz   Power Step   Walk-Fit Arch Cradles     Most of these can be found at your local [employer], [employer], [employer], sporting [employer], or online:    1.  https://www.Vusay.Bragg Peak Systems/en-us/  2.  Https://Passport Brands.Bragg Peak Systems/  3.  Https://www.Smallknots.Bragg Peak Systems/    **A good high quality over the counter insert should cost around $40-$50            PRICE THERAPY  Many aches and pains  throughout the foot and ankle can be helped with many simple treatments. This is usually described as PRICE Therapy.      P - Protection - often times, inflammation/pain in the lower extremity is not able to improve simply because the areas involved are never allowed to rest. Every step we take can bother the problematic area. Protecting those areas is an important step in the healing process. This may involve a walking cast boot, a special insert/orthotic device, an ankle brace, or simply avoiding barefoot walking.    R - Rest - in addition to protecting the foot/ankle, resting is an important, but often times difficult, treatment option. Getting off your feet when they bother you, and specifically avoiding activities that cause pain/discomfort, are very beneficial to prevent, and treat, foot/ankle pain.      I - Ice - icing regularly can help to decrease inflammation and swelling in the foot, thus decreasing pain. Using an ice pack or a bag of frozen veggies works very well. Ice for 20 minutes multiple times per day as needed.  Do not place the ice directly on the skin as this can cause tissue damage.    C - Compression - using a compression wrap or an ACE wrap can help to decrease swelling, which can help to decrease pain. Wearing the wraps is generally not needed at night, but they should be worn on a regular basis when you are going to be on your feet for prolonged periods as gravity tends to pull fluids down to your feet/ankles.    E - Elevation - elevating your lower extremities multiple times daily for 15-20 minutes can help to decrease swelling, which works well in decreasing pain levels.              Follow-ups after your visit        Follow-up notes from your care team     Return if symptoms worsen or fail to improve.      Your next 10 appointments already scheduled     Nov 08, 2018 10:00 AM CST   Return Visit with Len Vann MD   [facility] [place] ([facility] [place])    [address]  [place] [id number] [phone]              Who to contact     If you have questions or need follow up information about today's clinic visit or your schedule please contact [facility] directly at [phone].  Normal or non-critical lab and imaging results will be communicated to you by MyChart, letter or phone within 4 business days after the clinic has received the results. If you do not hear from us within 7 days, please contact the clinic through MyChart or phone. If you have a critical or abnormal lab result, we will notify you by phone as soon as possible.  Submit refill requests through Kites or call your pharmacy and they will forward the refill request to us. Please allow 3 business days for your refill to be completed.          Additional Information About Your Visit        Care EveryWhere ID     This is your Care EveryWhere ID. This could be used by other organizations to access your [place] medical records  [id number]        Your Vitals Were     Pulse Height BMI (Body Mass Index)             89 5' (1.524 m) 19.53 kg/m2          Blood Pressure from Last 3 Encounters:   10/31/18 130/85   05/09/18 128/79   10/04/17 137/80    Weight from Last 3 Encounters:   10/31/18 100 lb (45.4 kg)   05/09/18 104 lb (47.2 kg)   10/04/17 100 lb (45.4 kg)              We Performed the Following     DEBRIDEMENT OF NAILS, 6 OR MORE        Primary Care Provider Office Phone # Fax #    M Lili Robertson [phone]       [facility] [address]  [facility] [address]        Equal Access to Services     [facility] AH: Hadii aad ku hadasho Soomaali, waaxda luqadaha, qaybta kaalmada adeegyaharitha, randee garza. So [facility] [place] [facility] [phone].    ATENCIÓN: Si habla español, tiene a beck disposición servicios gratuitos de asistencia lingüística. [place] al [phone].    We comply with applicable federal civil rights laws and [place] laws. We do not  discriminate on the basis of race, color, national origin, age, disability, sex, sexual orientation, or gender identity.            Thank you!     Thank you for choosing [facility]  for your care. Our goal is always to provide you with excellent care. Hearing back from our patients is one way we can continue to improve our services. Please take a few minutes to complete the written survey that you may receive in the mail after your visit with us. Thank you!             Your Updated Medication List - Protect others around you: Learn how to safely use, store and throw away your medicines at www.disposemymeds.org.          This list is accurate as of 10/31/18 10:35 AM.  Always use your most recent med list.                   Brand Name Dispense Instructions for use Diagnosis    ALPRAZolam 0.25 MG tablet    XANAX     Take 0.25 mg by mouth        Calcium Oyster Shell 1250 (500 Ca) MG Tabs      Take 1 Tab by mouth Once Daily.        cholecalciferol 1000 UNIT tablet    vitamin D3     1 TABLET DAILY        DILTIAZEM CD CP24# 180 MG OR      1 CAPSULE DAILY        hydroxychloroquine 200 MG tablet    PLAQUENIL    90 tablet    Take 1 tablet (200 mg) by mouth daily    Rheumatoid arthritis involving multiple sites, unspecified rheumatoid factor presence (H)       nortriptyline 10 MG capsule    PAMELOR     1 CAPSULE AT BEDTIME        ONE-A-DAY 50 PLUS PO      None Entered        order for DME     30 days    Equipment being ordered: [facility] Fax [phone]  Primary Dressing Majo   Qty 15 Secondary Dressing 4' dermacea roll gauze Qty 30 Secondary Dressing 2' medipore tape Qty 1 Length of Need: 1 month Frequency of dressing change: daily    Open wound of knee, leg, and ankle, left, subsequent encounter       simvastatin 10 MG tablet    ZOCOR     TAKE 1 TABLET BY MOUTH ONCE DAILY.        sulfamethoxazole-trimethoprim 800-160 MG per tablet    BACTRIM DS/SEPTRA DS     Reported on 3/3/2017        TYLENOL  325 MG tablet   Generic drug:  acetaminophen      Take 325 mg by mouth as needed.

## 2019-02-19 DIAGNOSIS — M06.9 RHEUMATOID ARTHRITIS INVOLVING MULTIPLE SITES, UNSPECIFIED RHEUMATOID FACTOR PRESENCE: ICD-10-CM

## 2019-02-19 RX ORDER — HYDROXYCHLOROQUINE SULFATE 200 MG/1
200 TABLET, FILM COATED ORAL DAILY
Qty: 90 TABLET | Refills: 1 | OUTPATIENT
Start: 2019-02-19

## 2019-02-19 NOTE — TELEPHONE ENCOUNTER
Rheumatology team: Please call to notify Ms. Ortez that hydroxychloroquine has not been refilled.  An ophthalmology toxicity monitoring exam is needed to safely continue hydroxychloroquine.  We have not received record of this being done.  Len Vann MD  2/19/2019 3:42 PM

## 2019-02-19 NOTE — TELEPHONE ENCOUNTER
Routing refill request to provider for review/approval because:  Drug not on the [facility] refill protocol     Requested Prescriptions   Pending Prescriptions Disp Refills     hydroxychloroquine (PLAQUENIL) 200 MG tablet 90 tablet 1     Sig: Take 1 tablet (200 mg) by mouth daily    There is no refill protocol information for this order        Asha Munoz RN

## 2019-02-21 NOTE — TELEPHONE ENCOUNTER
Called and spoke to the patient. Informed patient of message and patient verbally understand.  Shikha Monsalve MA

## 2019-11-25 ENCOUNTER — RECORDS - HEALTHEAST (OUTPATIENT)
Dept: LAB | Facility: CLINIC | Age: 84
End: 2019-11-25

## 2019-11-25 LAB
ANION GAP SERPL CALCULATED.3IONS-SCNC: 11 MMOL/L (ref 5–18)
BUN SERPL-MCNC: 15 MG/DL (ref 8–28)
CALCIUM SERPL-MCNC: 8.3 MG/DL (ref 8.5–10.5)
CHLORIDE BLD-SCNC: 103 MMOL/L (ref 98–107)
CO2 SERPL-SCNC: 26 MMOL/L (ref 22–31)
CREAT SERPL-MCNC: 0.62 MG/DL (ref 0.6–1.1)
GFR SERPL CREATININE-BSD FRML MDRD: >60 ML/MIN/1.73M2
GLUCOSE BLD-MCNC: 61 MG/DL (ref 70–125)
POTASSIUM BLD-SCNC: 3.6 MMOL/L (ref 3.5–5)
SODIUM SERPL-SCNC: 140 MMOL/L (ref 136–145)

## 2019-11-27 ENCOUNTER — RECORDS - HEALTHEAST (OUTPATIENT)
Dept: LAB | Facility: CLINIC | Age: 84
End: 2019-11-27

## 2019-11-27 LAB
ANION GAP SERPL CALCULATED.3IONS-SCNC: 11 MMOL/L (ref 5–18)
BUN SERPL-MCNC: 18 MG/DL (ref 8–28)
CALCIUM SERPL-MCNC: 8.5 MG/DL (ref 8.5–10.5)
CHLORIDE BLD-SCNC: 105 MMOL/L (ref 98–107)
CO2 SERPL-SCNC: 24 MMOL/L (ref 22–31)
CREAT SERPL-MCNC: 0.69 MG/DL (ref 0.6–1.1)
ERYTHROCYTE [DISTWIDTH] IN BLOOD BY AUTOMATED COUNT: 16.3 % (ref 11–14.5)
GFR SERPL CREATININE-BSD FRML MDRD: >60 ML/MIN/1.73M2
GLUCOSE BLD-MCNC: 117 MG/DL (ref 70–125)
HCT VFR BLD AUTO: 35.5 % (ref 35–47)
HGB BLD-MCNC: 10.8 G/DL (ref 12–16)
MCH RBC QN AUTO: 27.1 PG (ref 27–34)
MCHC RBC AUTO-ENTMCNC: 30.4 G/DL (ref 32–36)
MCV RBC AUTO: 89 FL (ref 80–100)
PLATELET # BLD AUTO: 412 THOU/UL (ref 140–440)
PMV BLD AUTO: 11.1 FL (ref 8.5–12.5)
POTASSIUM BLD-SCNC: 3.4 MMOL/L (ref 3.5–5)
RBC # BLD AUTO: 3.99 MILL/UL (ref 3.8–5.4)
SODIUM SERPL-SCNC: 140 MMOL/L (ref 136–145)
WBC: 11.4 THOU/UL (ref 4–11)

## 2020-02-12 ENCOUNTER — OFFICE VISIT (OUTPATIENT)
Dept: PODIATRY | Facility: CLINIC | Age: 85
End: 2020-02-12
Payer: COMMERCIAL

## 2020-02-12 VITALS
HEIGHT: 60 IN | BODY MASS INDEX: 18.49 KG/M2 | SYSTOLIC BLOOD PRESSURE: 132 MMHG | DIASTOLIC BLOOD PRESSURE: 74 MMHG | WEIGHT: 94.2 LBS | HEART RATE: 94 BPM

## 2020-02-12 DIAGNOSIS — B35.1 ONYCHOMYCOSIS: ICD-10-CM

## 2020-02-12 DIAGNOSIS — M20.42 HAMMER TOE OF LEFT FOOT: ICD-10-CM

## 2020-02-12 DIAGNOSIS — L84 CALLUS: Primary | ICD-10-CM

## 2020-02-12 PROCEDURE — 99213 OFFICE O/P EST LOW 20 MIN: CPT | Performed by: PODIATRIST

## 2020-02-12 ASSESSMENT — MIFFLIN-ST. JEOR: SCORE: 743.79

## 2020-02-12 NOTE — PATIENT INSTRUCTIONS
Thank you for choosing [facility]!    Follow up as needed    DR. REDDING'S CLINIC LOCATIONS     MONDAY  [place] TUESDAY & FRIDAY AM  [place]   [address] [facility]   [address]   [place] [address] [place], [place] [address] [phone]  [phone]  [phone]       WEDNESDAY  [place] SCHEDULE SURGERY: [phone]   [address] APPOINTMENTS: [phone]   [place] [address] BILLING QUESTIONS: [phone]   [phone]         ROUTINE FOOT CARE (NAIL TRIMMING / CALLUSES)    Go to afcna.org ([employer]) and search for providers near you.  Otherwise, this is a list we have complied of  recommended locations/providers in [place].    [facility]   [phone]   [facility]  [phone]  [email or web address]  [phone]  [place] + 15 mile radius [facility]  [phone]  [facility].   Mary Puente, DPM  [address] [place] [address] [phone] Jose Ramon Leslie, DPM  [address] [place] [address] [phone]   [facility]  [phone] [address] [place] [address]  [facility]  Dr. Osman Lechuga  [phone]  [place], [place]   [facility]  [phone]  [place] [facility] [place] Locations  (does not take BCBS) FYI:  *Some providers accept insurance while others are out of pocket. Please contact them for details*     NAIL FUNGUS / ONYCHOMYCOSIS   Nail fungus is not a hygiene problem and will not likely lead to significant medical   problems. The nails may get thick causing pain and possibly local skin infection.   Treatments include debridement (trimming), oral antifungals, topical antifungals and complete removal of the nail. Most fungal nails are not treated.   Topicals such as tea tree oil can be helpful for surface fungus and may, at best, limit   progression. Over the counter creams (such as  Lamisil) can also be used however, their effectiveness is also quite low.  Topical treatment with Pen lac is expensive and often not covered by insurance. Pen lac has an approximate 8% success rate. Topical therapy recommendations is to apply twice a day for at least 3-4 months as it takes 9 months for new nail to grow out.    Experts suggest soaking your feet for 15 to 20 minutes in a mixture of 1 cup vinegar to 4 cups warm water. Be sure to rinse well and pat your feet dry when you're done. You can soak your feet like this daily. But if your skin becomes irritated, try soaking only two to three times a week. Vicks VapoRub, as with vinegar, there have been no controlled clinical trials to assess the effectiveness of Vicks VapoRub on nail fungus, but there have been numerous anecdotal reports that it works. There's no consensus on how often to apply this product, so check with your doctor before using it on your nails.     Oral therapies include Sporanox and Lamisil. Oral therapies are also expensive and not very effective. Side effects such as liver disease are the main concern. Return of fungus is common even if the treatment worked.     Other Tips:  - Penlac nail medication apply daily x 4 months; remove old polish first day of each week  - Antifungal cream/powder (Zeasorb) - apply daily to feet and shoes x 2 months  - Clean shoes with Lysol or in washing machine every few weeks  - Rotate shoe gear; give them 24 hours to dry out between days wearing them  - Clean pair of socks in morning, clean pair in afternoon if your feet sweat  - Shower shoes used in public showers/pools    CALLUSES / CORNS / IPKs / POROKERATOSIS  When there is excessive friction or pressure on the skin, the body responds by making the skin thicker to protect the deeper structures from becoming exposed. While this works well to protect the deeper structures, the thickened skin can increase pressure and pain.    Flat, diffuse thickening are  simple calluses and they are usually caused by friction. Often these are the result of rubbing on a shoe or going barefoot.    Calluses with a central core between the toes are called corns. These result from prominent joints on adjacent toes rubbing together.  Theses are a symptom of bone malalignment and will always recur unless the underlying bones are addressed surgically.    Calluses with a central core on the ball of the foot are usually IPKs (intractable plantar keratosis). These are caused by excessive pressure from the metatarsals, the bones that make up the ball of the foot. Often one of these bones is too long or too prominent. Again, these will always recur unless the underlying bone issue is addressed. There is no cure for these. They will either go away by themselves, recur, or more could develop.    Regardless of the diagnosis, most of these lesions can be kept comfortable with routine maintenance.   1.This consists of filing them with a pumice stone or callus file a couple of times per week.    2. Lotion can be applied to soften the callus. A urea based cream such as Kerasal or Vanicream or thicker cream with shea butter are good options.  3. Toe spacers or toe covers can be used for corns, gel pads can be used for other lesions on the bottom of the foot.   If there is a surgical pathology noted, such as a prominent bone, often this needs to be addressed surgically to minimize recurrence. However, sometimes the lesion simply migrates to another spot after surgery, so it is not a guaranteed cure.     Please call with any additional questions.     We discussed the potential costs of callus trimming including the possibility this may not be covered under insurance. Cost of this can be around $150 if paying out of pocket.    HAMMERTOES  Hammertoe is a contracture (bending) of one or both joints of the second, third, fourth, or fifth (little) toes. This abnormal bending can put pressure on the toe when  wearing shoes, causing problems to develop.  Hammertoes usually start out as mild deformities and get progressively worse over time. In the earlier stages, hammertoes are flexible and the symptoms can often be managed with noninvasive measures. But if left untreated, hammertoes can become more rigid and will not respond to non-surgical treatment.  Because of the progressive nature of hammertoes, they should receive early attention. Hammertoes never get better without some kind of intervention.  CAUSES  The most common cause of hammertoe is a muscle/tendon imbalance. This imbalance, which leads to a bending of the toe, results from mechanical (structural) changes in the foot that occur over time in some people.  Hammertoes may be aggravated by shoes that don t fit properly. A hammertoe may result if a toe is too long and is forced into a cramped position when a tight shoe is worn.  Occasionally, hammertoe is the result of an earlier trauma to the toe. In some people, hammertoes are inherited.  SYMPTOMS  Common symptoms of hammertoes include:  Pain or irritation of the affected toe when wearing shoes.   Corns and calluses (a buildup of skin) on the toe, between two toes, or on the ball of the foot. Corns are caused by constant friction against the shoe. They may be soft or hard, depending upon their location.   Inflammation, redness, or a burning sensation   Contracture of the toe   In more severe cases of hammertoe, open sores may form.   DIAGNOSIS  Although hammertoes are readily apparent, to arrive at a diagnosis the foot and ankle surgeon will obtain a thorough history of your symptoms and examine your foot. During the physical examination, the doctor may attempt to reproduce your symptoms by manipulating your foot and will study the contractures of the toes. In addition, the foot and ankle surgeon may take x-rays to determine the degree of the deformities and assess any changes that may have occurred.    Hammertoes are progressive - they don t go away by themselves and usually they will get worse over time. However, not all cases are alike - some hammertoes progress more rapidly than others. Once your foot and ankle surgeon has evaluated your hammertoes, a treatment plan can be developed that is suited to your needs.  NON-SURGICAL TREATMENT  There is a variety of treatment options for hammertoe. The treatment your foot and ankle surgeon selects will depend upon the severity of your hammertoe and other factors.  A number of non-surgical measures can be undertaken:  Padding corns and calluses. Your foot and ankle surgeon can provide or prescribe pads designed to shield corns from irritation. If you want to try over-the-counter pads, avoid the medicated types. Medicated pads are generally not recommended because they may contain a small amount of acid that can be harmful. Consult your surgeon about this option.   Changes in shoewear. Avoid shoes with pointed toes, shoes that are too short, or shoes with high heels - conditions that can force your toe against the front of the shoe. Instead, choose comfortable shoes with a deep, roomy toe box and heels no higher than two inches.   Orthotic devices. A custom orthotic device placed in your shoe may help control the muscle/tendon imbalance.   Injection therapy. Corticosteroid injections are sometimes used to ease pain and inflammation caused by hammertoe.   Medications. Oral nonsteroidal anti-inflammatory drugs (NSAIDs), such as ibuprofen, may be recommended to reduce pain and inflammation.   Splinting/strapping. Splints or small straps may be applied by the surgeon to realign the bent toe.     SURGICAL TREATMENT  Hammertoe surgery is complex. The surgical procedure is an attempt to help the toe lay in a better position. Nearly every structure in the toe will be cut including the tendons, ligaments, skin and bone. Hammertoes are a complex deformity and final toe position  is difficult to predict. The only sure way to position a toe is to fuse all three digital joints. That will not happen as some degree of toe motion is needed for walking. The toe may not be completely reduced as the surrounding skin and other structures may not allow the toe to return to a normal position. The tendons on adjacent toes may need to be cut at the time of the original or subsequent surgeries, as interconnections exist between the toes. The toe may drift after surgery. Stiffness may develop leading to new areas of pressure.   Future shoe choices will be critical in allowing the surgery to provide comfort. The toes will still hurt if shoes rub. The original pain may also persist as other foot problems may be contributing to the current pain. The toe may or may not be pinned in place. External pins would require complete avoidance of water on the foot for six weeks. The pin would be removed about six weeks after the surgery. Strict attention to protection is critical. The pin could get bumped or loosen resulting in early removal. Removal might be necessary before the bone heals which would negatively affect the final surgical outcome and toe alignment.         BODY WEIGHT AND YOUR FEET  The following information is included in the after visit summary for all patients. Body weight can be a sensitive issue to discuss in clinic, but we think the following information is very important. Although we focus on the feet and ankles, we do support the overall health of our patients.     Many things can cause foot and ankle problems. Foot structure, activity level, foot mechanics and injuries are common causes of pain. One very important issue that often goes unmentioned, is body weight. Extra weight can cause increased stress on muscles, ligaments, bones and tendons. Sometimes just a few extra pounds is all it takes to put one over her/his threshold. Without reducing that stress, it can be difficult to alleviate  pain. As Foot & Ankle specialists, our job is addressing the lower extremity problem and possible causes. Regarding extra body weight, we encourage patients to discuss diet and weight management plans with their primary care doctors. It is this team approach that gives you the best opportunity for pain relief and getting you back on your feet.      [place] has a Comprehensive Weight Management Program. This program includes counseling, education, non-surgical and surgical approaches to weight loss. If you are interested in learning more either talk to you primary care provider or call [phone].

## 2020-02-12 NOTE — PROGRESS NOTES
PATIENT HISTORY:  Marisela Ortez is a 89 year old female who presents to clinic for L 4th toe pain.  Daughter present.  Hx of nail fungus.  1/10 pain currently.  Worse with pressure, better with rest.  No injury.  No f/c.  Retired.  Nondiabetic.  Family hx of DM.     EXAM:Vitals: /74   Pulse 94   Ht 1.524 m (5')   Wt 42.7 kg (94 lb 3.2 oz)   BMI 18.40 kg/m    BMI= Body mass index is 18.4 kg/m .    General appearance: Patient is alert and fully cooperative with history & exam.  No sign of distress is noted during the visit.     Dermatologic: b/l toenails dystrophic, discolored, thickened.  L 4th toe with tiny callus to tip of toe. No paronychia or evidence of soft tissue infection is noted.  Atrophic skin to feet.     Vascular: DP & PT pulses are 1/4.  No significant edema.  Varicosities noted.  CFT and skin temperature are normal to both lower extremities.     Neurologic: Lower extremity sensation is intact to light touch.  No evidence of weakness or contracture in the lower extremities.  No evidence of neuropathy.     Musculoskeletal: R foot bunion, lesser hammertoes, moreso on L with pain at tip of L 4th toe.  Patient is ambulatory.  No gross ankle deformity noted.  No foot or ankle joint effusion is noted.     ASSESSMENT:   L 4th toe pain, callus, hammertoe     PLAN:  Reviewed patient's chart in epic.  Discussed condition and treatment options including pros and cons.    Hammertoe treatment options were discussed.  This included both surgical and non-surgical treatment alternatives.  Non-surgical options include wide fitting shoes, deep toe box, crest pad or foam pads, foot orthotics and debridement of any callous as necessary.  Surgical treatments were explained including soft tissue release, arthroplasty -- I would rather avoid surgery for her.      With consent I debrided the callus at the tip of the L 4th toe with a 15 blade; this was minimal so no procedure billed.    Crest pad given to offload  toe.  Roomier deeper shoes advised.    List of routine foot care resources given for nail care/callus care needs.    F/u prn.    Andrae Mendez DPM, FACFAS    Weight management plan Patient was referred to their PCP to discuss a diet and exercise plan.

## 2020-02-12 NOTE — LETTER
2/12/2020         RE: Marisela Ortez  [address]  [facility] [address]        Dear Colleague,    Thank you for referring your patient, Marisela Ortez, to the [facility]. Please see a copy of my visit note below.    PATIENT HISTORY:  Marisela Ortez is a 89 year old female who presents to clinic for L 4th toe pain.  Daughter present.  Hx of nail fungus.  1/10 pain currently.  Worse with pressure, better with rest.  No injury.  No f/c.  Retired.  Nondiabetic.  Family hx of DM.     EXAM:Vitals: /74   Pulse 94   Ht 1.524 m (5')   Wt 42.7 kg (94 lb 3.2 oz)   BMI 18.40 kg/m     BMI= Body mass index is 18.4 kg/m .    General appearance: Patient is alert and fully cooperative with history & exam.  No sign of distress is noted during the visit.     Dermatologic: b/l toenails dystrophic, discolored, thickened.   L 4th toe with tiny callus to tip of toe. No paronychia or evidence of soft tissue infection is noted.  Atrophic skin to feet.     Vascular: DP & PT pulses are 1/4.  No significant edema.  Varicosities noted.  CFT and skin temperature are normal to both lower extremities.     Neurologic: Lower extremity sensation is intact to light touch.  No evidence of weakness or contracture in the lower extremities.  No evidence of neuropathy.     Musculoskeletal: R foot bunion, lesser hammertoes, moreso on L with pain at tip of L 4th toe.  Patient is ambulatory.  No gross ankle deformity noted.  No foot or ankle joint effusion is noted.     ASSESSMENT:   L 4th toe pain, callus, hammertoe     PLAN:  Reviewed patient's chart in epic.  Discussed condition and treatment options including pros and cons.    Hammertoe treatment options were discussed.  This included both surgical and non-surgical treatment alternatives.  Non-surgical options include wide fitting shoes, deep toe box, crest pad or foam pads, foot orthotics and debridement of any callous as necessary.  Surgical treatments  were explained including soft tissue release, arthroplasty -- I would rather avoid surgery for her.      With consent I debrided the callus at the tip of the L 4th toe with a 15 blade; this was minimal so no procedure billed.    Crest pad given to offload toe.  Roomier deeper shoes advised.    List of routine foot care resources given for nail care/callus care needs.    F/u prn.    Andrae Mendez DPM, FACFAS    Weight management plan Patient was referred to their PCP to discuss a diet and exercise plan.        Again, thank you for allowing me to participate in the care of your patient.        Sincerely,        Andrae Mendez DPM

## 2022-04-13 ENCOUNTER — TELEPHONE (OUTPATIENT)
Dept: RHEUMATOLOGY | Facility: CLINIC | Age: 87
End: 2022-04-13
Payer: COMMERCIAL

## 2022-04-13 NOTE — TELEPHONE ENCOUNTER
Patient is in a lot of pain and would like to see Dr. Vann in person ASAP.  Please call daughter Carmen to discuss option at [phone].

## 2022-04-15 NOTE — TELEPHONE ENCOUNTER
Rheumatology team: Please call Mariselalorenzo Ortez to see if she would like to be seen at 3:40 PM on Monday, April 25, 2022.  Another option is Monday April 18, 2022 at 7:20 AM.  These are the earliest appointments I could find.  Only book one of these time slots if it is open at the time of scheduling.  Please book it as a new patient appointment.    Len Vann MD  4/15/2022 6:29 AM

## 2022-04-18 ENCOUNTER — PATIENT OUTREACH (OUTPATIENT)
Dept: CARE COORDINATION | Facility: CLINIC | Age: 87
End: 2022-04-18
Payer: COMMERCIAL

## 2022-04-18 NOTE — TELEPHONE ENCOUNTER
Patient has been scheduled for Monday April 25th with her daughter Carmen.  Zina Brewster, [facility]  4/18/2022

## 2022-04-18 NOTE — PROGRESS NOTES
Contact   Chart Review     Situation: Patient chart reviewed by .    Background: Call from patient. Unclear how she called this number.     Assessment: She wanted to confirm where her appt was on 4-25. Gave her the address for [facility] in [place] with rheumatology and then saw she has [employer] insurance.  Did three way call to scheduling and they confirmed that they do not accept [employer] insurance.  Patient had trouble hearing on the phone and tried to provide daughter's phone number and disconnected the call.  Daughter on consent to communicate. Talked to her and relayed this information. She will assist her mom in finding an in network provider.       Plan/Recommendations: no further outreach.     Josee Beltran,   [facility]  [phone]

## 2022-05-24 ENCOUNTER — TELEPHONE (OUTPATIENT)
Dept: WOUND CARE | Facility: CLINIC | Age: 87
End: 2022-05-24
Payer: COMMERCIAL

## 2022-05-24 NOTE — TELEPHONE ENCOUNTER
Fax referral received from [facility]. Patient called to verify insurance. Patient states she has [employer] insurance. Discussed with patient that [facility] does not take [employer] insurance. Discussed with patient that the referring provider will be called to inform them we are not able to accept referral and to reach out to the provider to determine next steps. Patient verbalized understanding. Oma Moore PA-C 's office called to inform them we are unable to accept referral. No further questions or concerns.

## 2024-07-30 NOTE — PROGRESS NOTES
Rheumatology team: please call Ms. Ortez to inform her that she has patellofemoral joint space loss that explains the knee pain.  X-rays of the hips are normal.  So the hip pain is likely secondary to the knee pain.  Physical therapy is beneficial for this and I have already placed the referral so strongly encourage her to go to PT.    Len Vann MD  3/3/2017 2:35 PM   152.4